# Patient Record
Sex: FEMALE | Race: WHITE | NOT HISPANIC OR LATINO | URBAN - NONMETROPOLITAN AREA
[De-identification: names, ages, dates, MRNs, and addresses within clinical notes are randomized per-mention and may not be internally consistent; named-entity substitution may affect disease eponyms.]

---

## 2024-03-06 ENCOUNTER — HOSPITAL ENCOUNTER (OUTPATIENT)
Dept: RADIOLOGY | Facility: HOSPITAL | Age: 74
Discharge: HOME | End: 2024-03-06
Payer: MEDICARE

## 2024-03-06 DIAGNOSIS — E78.5 HYPERLIPIDEMIA, UNSPECIFIED: ICD-10-CM

## 2024-03-06 PROCEDURE — 75571 CT HRT W/O DYE W/CA TEST: CPT

## 2024-11-27 ENCOUNTER — APPOINTMENT (OUTPATIENT)
Dept: RADIOLOGY | Facility: HOSPITAL | Age: 74
End: 2024-11-27
Payer: MEDICARE

## 2024-11-27 ENCOUNTER — HOSPITAL ENCOUNTER (EMERGENCY)
Facility: HOSPITAL | Age: 74
Discharge: HOME | End: 2024-11-27
Attending: EMERGENCY MEDICINE
Payer: MEDICARE

## 2024-11-27 VITALS
RESPIRATION RATE: 19 BRPM | WEIGHT: 253.53 LBS | HEART RATE: 85 BPM | BODY MASS INDEX: 37.55 KG/M2 | SYSTOLIC BLOOD PRESSURE: 130 MMHG | OXYGEN SATURATION: 99 % | DIASTOLIC BLOOD PRESSURE: 80 MMHG | TEMPERATURE: 97.2 F | HEIGHT: 69 IN

## 2024-11-27 DIAGNOSIS — S39.012A STRAIN OF LUMBAR REGION, INITIAL ENCOUNTER: ICD-10-CM

## 2024-11-27 DIAGNOSIS — T07.XXXA MULTIPLE CONTUSIONS: ICD-10-CM

## 2024-11-27 DIAGNOSIS — S09.90XA HEAD INJURY, INITIAL ENCOUNTER: ICD-10-CM

## 2024-11-27 DIAGNOSIS — S40.011A CONTUSION OF MULTIPLE SITES OF RIGHT SHOULDER, INITIAL ENCOUNTER: ICD-10-CM
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
## 2024-11-27 LAB
ANION GAP SERPL CALC-SCNC: 10 MMOL/L (ref 10–20)
APPEARANCE UR: ABNORMAL
BASOPHILS # BLD AUTO: 0.06 X10*3/UL (ref 0–0.1)
BASOPHILS NFR BLD AUTO: 0.6 %
BILIRUB UR STRIP.AUTO-MCNC: NEGATIVE MG/DL
BUN SERPL-MCNC: 19 MG/DL (ref 6–23)
CALCIUM SERPL-MCNC: 9.2 MG/DL (ref 8.6–10.3)
CHLORIDE SERPL-SCNC: 102 MMOL/L (ref 98–107)
CO2 SERPL-SCNC: 32 MMOL/L (ref 21–32)
COLOR UR: YELLOW
CREAT SERPL-MCNC: 0.64 MG/DL (ref 0.5–1.05)
EGFRCR SERPLBLD CKD-EPI 2021: >90 ML/MIN/1.73M*2
EOSINOPHIL # BLD AUTO: 0.09 X10*3/UL (ref 0–0.4)
EOSINOPHIL NFR BLD AUTO: 0.8 %
ERYTHROCYTE [DISTWIDTH] IN BLOOD BY AUTOMATED COUNT: 13.2 % (ref 11.5–14.5)
GLUCOSE SERPL-MCNC: 111 MG/DL (ref 74–99)
GLUCOSE UR STRIP.AUTO-MCNC: NEGATIVE MG/DL
HCT VFR BLD AUTO: 45.9 % (ref 36–46)
HGB BLD-MCNC: 15 G/DL (ref 12–16)
IMM GRANULOCYTES # BLD AUTO: 0.04 X10*3/UL (ref 0–0.5)
IMM GRANULOCYTES NFR BLD AUTO: 0.4 % (ref 0–0.9)
KETONES UR STRIP.AUTO-MCNC: ABNORMAL MG/DL
LEUKOCYTE ESTERASE UR QL STRIP.AUTO: NEGATIVE
LYMPHOCYTES # BLD AUTO: 1.48 X10*3/UL (ref 0.8–3)
LYMPHOCYTES NFR BLD AUTO: 13.8 %
MCH RBC QN AUTO: 31.3 PG (ref 26–34)
MCHC RBC AUTO-ENTMCNC: 32.7 G/DL (ref 32–36)
MCV RBC AUTO: 96 FL (ref 80–100)
MONOCYTES # BLD AUTO: 0.99 X10*3/UL (ref 0.05–0.8)
MONOCYTES NFR BLD AUTO: 9.2 %
MUCOUS THREADS #/AREA URNS AUTO: NORMAL /LPF
NEUTROPHILS # BLD AUTO: 8.08 X10*3/UL (ref 1.6–5.5)
NEUTROPHILS NFR BLD AUTO: 75.2 %
NITRITE UR QL STRIP.AUTO: NEGATIVE
NRBC BLD-RTO: 0 /100 WBCS (ref 0–0)
PH UR STRIP.AUTO: 6 [PH]
PLATELET # BLD AUTO: 238 X10*3/UL (ref 150–450)
POTASSIUM SERPL-SCNC: 3.6 MMOL/L (ref 3.5–5.3)
PROT UR STRIP.AUTO-MCNC: ABNORMAL MG/DL
RBC # BLD AUTO: 4.79 X10*6/UL (ref 4–5.2)
RBC # UR STRIP.AUTO: ABNORMAL /UL
RBC #/AREA URNS AUTO: NORMAL /HPF
SODIUM SERPL-SCNC: 140 MMOL/L (ref 136–145)
SP GR UR STRIP.AUTO: 1.02
SQUAMOUS #/AREA URNS AUTO: NORMAL /HPF
UROBILINOGEN UR STRIP.AUTO-MCNC: 2 MG/DL
WBC # BLD AUTO: 10.7 X10*3/UL (ref 4.4–11.3)
WBC #/AREA URNS AUTO: NORMAL /HPF

## 2024-11-27 PROCEDURE — 73000 X-RAY EXAM OF COLLAR BONE: CPT | Mod: RIGHT SIDE | Performed by: RADIOLOGY

## 2024-11-27 PROCEDURE — 70450 CT HEAD/BRAIN W/O DYE: CPT | Performed by: RADIOLOGY

## 2024-11-27 PROCEDURE — 72125 CT NECK SPINE W/O DYE: CPT | Performed by: RADIOLOGY

## 2024-11-27 PROCEDURE — 80048 BASIC METABOLIC PNL TOTAL CA: CPT | Performed by: EMERGENCY MEDICINE

## 2024-11-27 PROCEDURE — 72131 CT LUMBAR SPINE W/O DYE: CPT

## 2024-11-27 PROCEDURE — 81001 URINALYSIS AUTO W/SCOPE: CPT | Performed by: EMERGENCY MEDICINE

## 2024-11-27 PROCEDURE — 73030 X-RAY EXAM OF SHOULDER: CPT | Mod: RT

## 2024-11-27 PROCEDURE — 85025 COMPLETE CBC W/AUTO DIFF WBC: CPT | Performed by: EMERGENCY MEDICINE

## 2024-11-27 PROCEDURE — 36415 COLL VENOUS BLD VENIPUNCTURE: CPT | Performed by: EMERGENCY MEDICINE

## 2024-11-27 PROCEDURE — 70450 CT HEAD/BRAIN W/O DYE: CPT

## 2024-11-27 PROCEDURE — 99285 EMERGENCY DEPT VISIT HI MDM: CPT | Mod: 25

## 2024-11-27 PROCEDURE — 73000 X-RAY EXAM OF COLLAR BONE: CPT | Mod: RT

## 2024-11-27 PROCEDURE — 72125 CT NECK SPINE W/O DYE: CPT

## 2024-11-27 PROCEDURE — 72131 CT LUMBAR SPINE W/O DYE: CPT | Performed by: RADIOLOGY

## 2024-11-27 RX ORDER — FLUTICASONE FUROATE, UMECLIDINIUM BROMIDE AND VILANTEROL TRIFENATATE 200; 62.5; 25 UG/1; UG/1; UG/1
1 POWDER RESPIRATORY (INHALATION)
COMMUNITY
Start: 2024-01-04 | End: 2025-01-03

## 2024-11-27 RX ORDER — EVOLOCUMAB 420 MG/3.5
420 KIT SUBCUTANEOUS
COMMUNITY
Start: 2024-06-15

## 2024-11-27 RX ORDER — CLOPIDOGREL BISULFATE 75 MG/1
75 TABLET ORAL DAILY
COMMUNITY

## 2024-11-27 RX ORDER — ALBUTEROL SULFATE 90 UG/1
2 INHALANT RESPIRATORY (INHALATION) EVERY 4 HOURS PRN
COMMUNITY

## 2024-11-27 RX ORDER — MONTELUKAST SODIUM 10 MG/1
1 TABLET ORAL NIGHTLY
COMMUNITY
Start: 2024-01-04

## 2024-11-27 RX ORDER — POTASSIUM CHLORIDE 750 MG/1
10 TABLET, EXTENDED RELEASE ORAL DAILY
COMMUNITY

## 2024-11-27 RX ORDER — PRAVASTATIN SODIUM 40 MG/1
40 TABLET ORAL DAILY
COMMUNITY

## 2024-11-27 RX ORDER — FUROSEMIDE 40 MG/1
80 TABLET ORAL
COMMUNITY
Start: 2024-01-04

## 2024-11-27 RX ORDER — LEVOTHYROXINE SODIUM 150 UG/1
150 TABLET ORAL DAILY
COMMUNITY

## 2024-11-27 ASSESSMENT — PAIN - FUNCTIONAL ASSESSMENT: PAIN_FUNCTIONAL_ASSESSMENT: 0-10

## 2024-11-27 ASSESSMENT — PAIN SCALES - GENERAL: PAINLEVEL_OUTOF10: 10 - WORST POSSIBLE PAIN

## 2024-11-27 NOTE — ED PROVIDER NOTES
[facility]   ED  Provider Note  11/27/2024 11:46 AM  AC08/AC08      Chief Complaint   Patient presents with    Fall        History of Present Illness:   Padmini Lang is a 74 y.o. female presenting to the ED for fall, beginning just prior to arrival.  The complaint has been persistent, moderate in severity, and worsened by movement of the right shoulder.  Patient is stroke 3 years ago and has problems with her balance and ambulation.  She is walking out the car on uneven ground when she lost her balance and fell.  She had no prodrome such as palpitations dizziness or blurred vision.  She did not trip.  She denies chest pain.  She denies change in speech or vision.  She complains of pain to the back of her head and neck.  Right shoulder and distal clavicle and lumbar spine.  She denies injury to her  lower extremities.  There is no loss of consciousness.      Review of Systems:   Pertinent positives and review of systems as noted above.  Remaining 10 review of systems is negative or noncontributory to today's episode of care.  Review of Systems       --------------------------------------------- PAST HISTORY ---------------------------------------------  Past Medical History:   Past Medical History:   Diagnosis Date    Adhesive capsulitis of right shoulder 06/22/2017    Adhesive capsulitis of both shoulders    Mast cell disease     Personal history of diseases of the blood and blood-forming organs and certain disorders involving the immune mechanism     History of anemia    Personal history of other diseases of the musculoskeletal system and connective tissue 11/05/2014    Personal history of arthritis    Personal history of other diseases of the respiratory system 11/05/2014    Personal history of asthma    Personal history of other endocrine, nutritional and metabolic disease 11/05/2014    History of thyroid disease    Personal history of other specified conditions     History of headache    Stroke (Multi)          Past Surgical History:   Past Surgical History:   Procedure Laterality Date    BACK SURGERY  09/23/2015    Back Surgery    HYSTERECTOMY  05/11/2017    Hysterectomy    OTHER SURGICAL HISTORY  01/31/2019    Cataract surgery    TOTAL HIP ARTHROPLASTY  05/11/2017    Hip Replacement    TOTAL KNEE ARTHROPLASTY  09/23/2015    Total Knee Arthroplasty    TUBAL LIGATION  05/11/2017    Tubal Ligation        Social History:   Social History     Social History Narrative    Not on file        Family History: family history is not on file. Unless otherwise noted, family history is non contributory    Patient's Medications   New Prescriptions    No medications on file   Previous Medications    ALBUTEROL 90 MCG/ACTUATION INHALER    Inhale 2 puffs every 4 hours if needed for wheezing or shortness of breath.    CLOPIDOGREL (PLAVIX) 75 MG TABLET    Take 1 tablet (75 mg) by mouth once daily.    FUROSEMIDE (LASIX) 40 MG TABLET    Take 2 tablets (80 mg) by mouth once daily.    MONTELUKAST (SINGULAIR) 10 MG TABLET    Take 1 tablet (10 mg) by mouth once daily at bedtime.    POTASSIUM CHLORIDE CR 10 MEQ ER TABLET    Take 1 tablet (10 mEq) by mouth once daily.    PRAVASTATIN (PRAVACHOL) 40 MG TABLET    Take 1 tablet (40 mg) by mouth once daily.    REPATHA PUSHTRONEX 420 MG/3.5 ML INJECTION    Inject 3.5 mL (420 mg) under the skin every 28 (twenty-eight) days.    RIMEGEPANT (NURTEC ODT) 75 MG TABLET,DISINTEGRATING    Dissolve 1 tablet (75 mg) in the mouth once daily as needed.    SYNTHROID 150 MCG TABLET    Take 1 tablet (150 mcg) by mouth once daily.    TRELEGY ELLIPTA 200-62.5-25 MCG BLISTER WITH DEVICE    Inhale 1 puff once daily.    VITAMIN B COMPLEX ORAL    Take 0.4 mg by mouth once daily.   Modified Medications    No medications on file   Discontinued Medications    No medications on file      The patient’s home medications have been reviewed.    Allergies: Aspirin, Azithromycin, Doxycycline, Fish containing products, Naproxen,  Nsaids (non-steroidal anti-inflammatory drug), Prochlorperazine, Shellfish containing products, Sulfamethoxazole-trimethoprim, Tree nuts, Acetaminophen, Cephalexin, Clindamycin, Nicorette ds, Iodinated contrast media, Adhesive tape-silicones, Carbamazepine, Carbinoxamine, Ciprofloxacin, Fentanyl, Gabapentin, Lidocaine, Metronidazole, Oxycodone, and Vancomycin    -------------------------------------------------- RESULTS -------------------------------------------------  All laboratory and radiology results have been personally reviewed by myself   LABS:  Labs Reviewed   CBC WITH AUTO DIFFERENTIAL - Abnormal       Result Value    WBC 10.7      nRBC 0.0      RBC 4.79      Hemoglobin 15.0      Hematocrit 45.9      MCV 96      MCH 31.3      MCHC 32.7      RDW 13.2      Platelets 238      Neutrophils % 75.2      Immature Granulocytes %, Automated 0.4      Lymphocytes % 13.8      Monocytes % 9.2      Eosinophils % 0.8      Basophils % 0.6      Neutrophils Absolute 8.08 (*)     Immature Granulocytes Absolute, Automated 0.04      Lymphocytes Absolute 1.48      Monocytes Absolute 0.99 (*)     Eosinophils Absolute 0.09      Basophils Absolute 0.06     BASIC METABOLIC PANEL - Abnormal    Glucose 111 (*)     Sodium 140      Potassium 3.6      Chloride 102      Bicarbonate 32      Anion Gap 10      Urea Nitrogen 19      Creatinine 0.64      eGFR >90      Calcium 9.2     URINALYSIS WITH REFLEX CULTURE AND MICROSCOPIC    Narrative:     The following orders were created for panel order Urinalysis with Reflex Culture and Microscopic.  Procedure                               Abnormality         Status                     ---------                               -----------         ------                     Urinalysis with Reflex C...[[id number]]                                                 Extra Urine Gray Tube[[id number]]                                                         Please view results for these tests on the individual  "orders.   URINALYSIS WITH REFLEX CULTURE AND MICROSCOPIC   EXTRA URINE GRAY TUBE         RADIOLOGY:  Interpreted by Radiologist.  XR shoulder right 2+ views   Final Result   No acute finding.   Signed by Surendra Vo MD      XR clavicle right   Final Result   No acute finding.   Signed by Surendra Vo MD      CT head wo IV contrast   Final Result   No acute intracranial abnormality. Consider follow-up with MRI as   warranted.             Signed by: Angel Russo 11/27/2024 12:58 PM   Dictation workstation:   [id number]      CT lumbar spine wo IV contrast   Final Result   No acute osseous abnormality in the lumbar spine.        Right lower lobe pulmonary nodule, for which follow-up with   nonemergent CT chest is recommended.        MACRO:   None        Signed by: Angel Russo 11/27/2024 1:08 PM   Dictation workstation:   [id number]      CT cervical spine wo IV contrast   Final Result   No evidence for an acute fracture or subluxation of the cervical   spine.        Signed by: Angel Russo 11/27/2024 1:06 PM   Dictation workstation:   [id number]          No results found for this or any previous visit (from the past 4464 hours).  ------------------------- NURSING NOTES AND VITALS REVIEWED ---------------------------   The nursing notes within the ED encounter and vital signs as below have been reviewed.   /64   Pulse 92   Temp 36.2 °C (97.2 °F)   Resp 17   Ht 1.753 m (5' 9\")   Wt 115 kg (253 lb 8.5 oz)   SpO2 94%   BMI 37.44 kg/m²   Oxygen Saturation Interpretation: Normal      ---------------------------------------------------PHYSICAL EXAM--------------------------------------  Physical Exam   Constitutional/General: Alert,  well appearing, non toxic in NAD  Head: Normocephalic and occipital tenderness is present.  Eyes: PERRL, EOMI, conjunctiva normal, sclera non icteric  Mouth: Oropharynx clear, handling secretions, no trismus, no asymmetry of the posterior oropharynx or uvular " edema  Neck: Supple, full ROM, non tender to palpation in the midline, no stridor, no crepitus, no meningeal signs  Respiratory: Lungs clear to auscultation bilaterally, no wheezes, rales, or rhonchi. Not in respiratory distress  Cardiovascular:  Regular rate. Regular rhythm. No murmurs, gallops, or rubs. 2+ distal pulses  Chest: No chest wall tenderness  GI:  Abdomen Soft, Non tender, Non distended.  +BS. No organomegaly, no palpable masses,  No rebound, guarding, or rigidity.   Musculoskeletal: Moves all extremities x 4. Warm and well perfused, no clubbing, cyanosis, or edema. Capillary refill <3 seconds tenderness over the right distal clavicle lungs shoulder.,  Lower to mid lumbar tenderness to palpation.  Integument: skin warm and dry. No rashes.   Lymphatic: no lymphadenopathy noted  Neurologic: No focal deficits, symmetric strength 5/5 in the upper and lower extremities bilaterally  Psychiatric: Normal Affect    Procedures    ------------------------------ ED COURSE/MEDICAL DECISION MAKING----------------------  Diagnoses as of 11/27/24 1322   Fall, initial encounter   Multiple contusions   Head injury, initial encounter   Contusion of multiple sites of right shoulder, initial encounter   Strain of lumbar region, initial encounter      Patient has multiple drug allergies.  She is taking Plavix.  She states she cannot take any pain medications.  Patient CT scan of the head and neck and lumbar spine show no acute fractures.  X-rays of the right clavicle and right shoulder are also negative for acute fracture.  Unfortunately patient truly has multiple drug allergies and everything she tries to take for pain causes more problems than it solves.  After her last shoulder surgery they gave her pain medicines for that she was discharged and she was blistered and itching by time she got home and had remitted here to the hospital for treatment.  I have asked use ice packs and a sling for comfort.  She is limited her  walking until her shoulder feels worn out that she can use her walker effectively.    Medical Decision Making:   Patient is stable for outpatient management  Diagnoses as of 11/27/24 1322   Fall, initial encounter   Multiple contusions   Head injury, initial encounter   Contusion of multiple sites of right shoulder, initial encounter   Strain of lumbar region, initial encounter      Counseling:   The emergency provider has spoken with the patient and spouse/SO and discussed today’s results, in addition to providing specific details for the plan of care and counseling regarding the diagnosis and prognosis.  Questions are answered at this time and they are agreeable with the plan.      --------------------------------- IMPRESSION AND DISPOSITION ---------------------------------        IMPRESSION  1. Fall, initial encounter    2. Multiple contusions    3. Head injury, initial encounter    4. Contusion of multiple sites of right shoulder, initial encounter    5. Strain of lumbar region, initial encounter        DISPOSITION  Disposition: Discharge to home  Patient condition is fair      Billing Provider Critical Care Time: 0 minutes     oJse Suh MD  11/27/24 1322

## 2024-11-27 NOTE — DISCHARGE INSTRUCTIONS
Ice packs for pain.    Use your sling for comfort.    Make sure to use your walker for safety.    Return for worsening symptoms or concerns.    See your doctor in 1 week for recheck.

## 2024-11-28 LAB — HOLD SPECIMEN: NORMAL

## 2025-02-03 ENCOUNTER — APPOINTMENT (OUTPATIENT)
Dept: CARDIOLOGY | Facility: HOSPITAL | Age: 75
End: 2025-02-03
Payer: MEDICARE

## 2025-02-03 ENCOUNTER — APPOINTMENT (OUTPATIENT)
Dept: RADIOLOGY | Facility: HOSPITAL | Age: 75
End: 2025-02-03
Payer: MEDICARE

## 2025-02-03 ENCOUNTER — HOSPITAL ENCOUNTER (INPATIENT)
Facility: HOSPITAL | Age: 75
LOS: 4 days | Discharge: HOME | End: 2025-02-08
Attending: EMERGENCY MEDICINE | Admitting: INTERNAL MEDICINE
Payer: MEDICARE

## 2025-02-03 DIAGNOSIS — I50.32 CHRONIC DIASTOLIC HEART FAILURE: ICD-10-CM

## 2025-02-03 DIAGNOSIS — J15.9 COMMUNITY ACQUIRED BACTERIAL PNEUMONIA: ICD-10-CM

## 2025-02-03 DIAGNOSIS — J18.9 MULTIFOCAL PNEUMONIA: ICD-10-CM

## 2025-02-03 DIAGNOSIS — A41.9 SEPSIS, DUE TO UNSPECIFIED ORGANISM, UNSPECIFIED WHETHER ACUTE ORGAN DYSFUNCTION PRESENT (MULTI): ICD-10-CM

## 2025-02-03 DIAGNOSIS — J96.01 ACUTE RESPIRATORY FAILURE WITH HYPOXIA (MULTI): ICD-10-CM

## 2025-02-03 DIAGNOSIS — J44.1 COPD EXACERBATION (MULTI): ICD-10-CM

## 2025-02-03 DIAGNOSIS — R09.02 HYPOXIA: ICD-10-CM

## 2025-02-03 DIAGNOSIS — R91.8 MULTILOBAR LUNG INFILTRATE: Primary | ICD-10-CM

## 2025-02-03 LAB
ALBUMIN SERPL BCP-MCNC: 3.5 G/DL (ref 3.4–5)
ALP SERPL-CCNC: 57 U/L (ref 33–136)
ALT SERPL W P-5'-P-CCNC: 22 U/L (ref 7–45)
ANION GAP SERPL CALC-SCNC: 9 MMOL/L (ref 10–20)
AST SERPL W P-5'-P-CCNC: 14 U/L (ref 9–39)
BASOPHILS # BLD AUTO: 0.05 X10*3/UL (ref 0–0.1)
BASOPHILS NFR BLD AUTO: 0.4 %
BILIRUB SERPL-MCNC: 0.4 MG/DL (ref 0–1.2)
BNP SERPL-MCNC: 109 PG/ML (ref 0–99)
BUN SERPL-MCNC: 6 MG/DL (ref 6–23)
CALCIUM SERPL-MCNC: 8.6 MG/DL (ref 8.6–10.3)
CARDIAC TROPONIN I PNL SERPL HS: 21 NG/L (ref 0–13)
CARDIAC TROPONIN I PNL SERPL HS: 21 NG/L (ref 0–13)
CHLORIDE SERPL-SCNC: 97 MMOL/L (ref 98–107)
CO2 SERPL-SCNC: 37 MMOL/L (ref 21–32)
CREAT SERPL-MCNC: 0.45 MG/DL (ref 0.5–1.05)
EGFRCR SERPLBLD CKD-EPI 2021: >90 ML/MIN/1.73M*2
EOSINOPHIL # BLD AUTO: 0.26 X10*3/UL (ref 0–0.4)
EOSINOPHIL NFR BLD AUTO: 2.2 %
ERYTHROCYTE [DISTWIDTH] IN BLOOD BY AUTOMATED COUNT: 12.7 % (ref 11.5–14.5)
FLUAV RNA RESP QL NAA+PROBE: NOT DETECTED
FLUBV RNA RESP QL NAA+PROBE: NOT DETECTED
GLUCOSE SERPL-MCNC: 109 MG/DL (ref 74–99)
HCT VFR BLD AUTO: 42.2 % (ref 36–46)
HGB BLD-MCNC: 13.3 G/DL (ref 12–16)
HOLD SPECIMEN: NORMAL
IMM GRANULOCYTES # BLD AUTO: 0.06 X10*3/UL (ref 0–0.5)
IMM GRANULOCYTES NFR BLD AUTO: 0.5 % (ref 0–0.9)
LACTATE SERPL-SCNC: 0.8 MMOL/L (ref 0.4–2)
LYMPHOCYTES # BLD AUTO: 1.9 X10*3/UL (ref 0.8–3)
LYMPHOCYTES NFR BLD AUTO: 16.3 %
MCH RBC QN AUTO: 31.1 PG (ref 26–34)
MCHC RBC AUTO-ENTMCNC: 31.5 G/DL (ref 32–36)
MCV RBC AUTO: 99 FL (ref 80–100)
MONOCYTES # BLD AUTO: 1.29 X10*3/UL (ref 0.05–0.8)
MONOCYTES NFR BLD AUTO: 11 %
NEUTROPHILS # BLD AUTO: 8.12 X10*3/UL (ref 1.6–5.5)
NEUTROPHILS NFR BLD AUTO: 69.6 %
NRBC BLD-RTO: 0 /100 WBCS (ref 0–0)
PLATELET # BLD AUTO: 312 X10*3/UL (ref 150–450)
POTASSIUM SERPL-SCNC: 3.6 MMOL/L (ref 3.5–5.3)
PROT SERPL-MCNC: 6.4 G/DL (ref 6.4–8.2)
RBC # BLD AUTO: 4.28 X10*6/UL (ref 4–5.2)
RSV RNA RESP QL NAA+PROBE: NOT DETECTED
SARS-COV-2 RNA RESP QL NAA+PROBE: NOT DETECTED
SODIUM SERPL-SCNC: 139 MMOL/L (ref 136–145)
WBC # BLD AUTO: 11.7 X10*3/UL (ref 4.4–11.3)

## 2025-02-03 PROCEDURE — 83880 ASSAY OF NATRIURETIC PEPTIDE: CPT | Performed by: EMERGENCY MEDICINE

## 2025-02-03 PROCEDURE — 96367 TX/PROPH/DG ADDL SEQ IV INF: CPT

## 2025-02-03 PROCEDURE — G0378 HOSPITAL OBSERVATION PER HR: HCPCS

## 2025-02-03 PROCEDURE — 71045 X-RAY EXAM CHEST 1 VIEW: CPT

## 2025-02-03 PROCEDURE — 93005 ELECTROCARDIOGRAM TRACING: CPT

## 2025-02-03 PROCEDURE — 36415 COLL VENOUS BLD VENIPUNCTURE: CPT | Performed by: EMERGENCY MEDICINE

## 2025-02-03 PROCEDURE — 94760 N-INVAS EAR/PLS OXIMETRY 1: CPT

## 2025-02-03 PROCEDURE — 99292 CRITICAL CARE ADDL 30 MIN: CPT | Performed by: EMERGENCY MEDICINE

## 2025-02-03 PROCEDURE — 96365 THER/PROPH/DIAG IV INF INIT: CPT

## 2025-02-03 PROCEDURE — 83605 ASSAY OF LACTIC ACID: CPT | Performed by: EMERGENCY MEDICINE

## 2025-02-03 PROCEDURE — 87634 RSV DNA/RNA AMP PROBE: CPT | Performed by: EMERGENCY MEDICINE

## 2025-02-03 PROCEDURE — 80053 COMPREHEN METABOLIC PANEL: CPT | Performed by: EMERGENCY MEDICINE

## 2025-02-03 PROCEDURE — 87040 BLOOD CULTURE FOR BACTERIA: CPT | Mod: CONLAB | Performed by: EMERGENCY MEDICINE

## 2025-02-03 PROCEDURE — 99291 CRITICAL CARE FIRST HOUR: CPT | Mod: 25 | Performed by: EMERGENCY MEDICINE

## 2025-02-03 PROCEDURE — 96361 HYDRATE IV INFUSION ADD-ON: CPT

## 2025-02-03 PROCEDURE — 84484 ASSAY OF TROPONIN QUANT: CPT | Performed by: EMERGENCY MEDICINE

## 2025-02-03 PROCEDURE — 2500000004 HC RX 250 GENERAL PHARMACY W/ HCPCS (ALT 636 FOR OP/ED): Performed by: EMERGENCY MEDICINE

## 2025-02-03 PROCEDURE — 94640 AIRWAY INHALATION TREATMENT: CPT

## 2025-02-03 PROCEDURE — 87637 SARSCOV2&INF A&B&RSV AMP PRB: CPT | Performed by: EMERGENCY MEDICINE

## 2025-02-03 PROCEDURE — 2500000005 HC RX 250 GENERAL PHARMACY W/O HCPCS: Performed by: EMERGENCY MEDICINE

## 2025-02-03 PROCEDURE — 9420000001 HC RT PATIENT EDUCATION 5 MIN

## 2025-02-03 PROCEDURE — 96375 TX/PRO/DX INJ NEW DRUG ADDON: CPT

## 2025-02-03 PROCEDURE — 2500000002 HC RX 250 W HCPCS SELF ADMINISTERED DRUGS (ALT 637 FOR MEDICARE OP, ALT 636 FOR OP/ED): Performed by: EMERGENCY MEDICINE

## 2025-02-03 PROCEDURE — 2500000005 HC RX 250 GENERAL PHARMACY W/O HCPCS: Performed by: NURSE PRACTITIONER

## 2025-02-03 PROCEDURE — 2500000001 HC RX 250 WO HCPCS SELF ADMINISTERED DRUGS (ALT 637 FOR MEDICARE OP): Performed by: NURSE PRACTITIONER

## 2025-02-03 PROCEDURE — 85025 COMPLETE CBC W/AUTO DIFF WBC: CPT | Performed by: EMERGENCY MEDICINE

## 2025-02-03 PROCEDURE — 71045 X-RAY EXAM CHEST 1 VIEW: CPT | Performed by: RADIOLOGY

## 2025-02-03 PROCEDURE — 94664 DEMO&/EVAL PT USE INHALER: CPT

## 2025-02-03 RX ORDER — POLYETHYLENE GLYCOL 3350 17 G/17G
17 POWDER, FOR SOLUTION ORAL DAILY
Status: DISCONTINUED | OUTPATIENT
Start: 2025-02-03 | End: 2025-02-05

## 2025-02-03 RX ORDER — CEFTRIAXONE 2 G/50ML
2 INJECTION, SOLUTION INTRAVENOUS ONCE
Status: COMPLETED | OUTPATIENT
Start: 2025-02-03 | End: 2025-02-03

## 2025-02-03 RX ORDER — CLOPIDOGREL BISULFATE 75 MG/1
75 TABLET ORAL DAILY
Status: DISCONTINUED | OUTPATIENT
Start: 2025-02-03 | End: 2025-02-08 | Stop reason: HOSPADM

## 2025-02-03 RX ORDER — IPRATROPIUM BROMIDE AND ALBUTEROL SULFATE 2.5; .5 MG/3ML; MG/3ML
3 SOLUTION RESPIRATORY (INHALATION) ONCE
Status: COMPLETED | OUTPATIENT
Start: 2025-02-03 | End: 2025-02-03

## 2025-02-03 RX ORDER — LEVOTHYROXINE SODIUM 75 UG/1
150 TABLET ORAL DAILY
Status: DISCONTINUED | OUTPATIENT
Start: 2025-02-04 | End: 2025-02-08 | Stop reason: HOSPADM

## 2025-02-03 RX ORDER — MONTELUKAST SODIUM 10 MG/1
10 TABLET ORAL NIGHTLY
Status: DISCONTINUED | OUTPATIENT
Start: 2025-02-03 | End: 2025-02-08 | Stop reason: HOSPADM

## 2025-02-03 RX ORDER — DIPHENHYDRAMINE HCL 25 MG
25 CAPSULE ORAL EVERY 6 HOURS PRN
Status: DISCONTINUED | OUTPATIENT
Start: 2025-02-03 | End: 2025-02-08 | Stop reason: HOSPADM

## 2025-02-03 RX ORDER — FUROSEMIDE 40 MG/1
80 TABLET ORAL
Status: DISCONTINUED | OUTPATIENT
Start: 2025-02-04 | End: 2025-02-05

## 2025-02-03 RX ORDER — EVOLOCUMAB 140 MG/ML
140 INJECTION, SOLUTION SUBCUTANEOUS
COMMUNITY
Start: 2024-11-20

## 2025-02-03 RX ORDER — AZITHROMYCIN MONOHYDRATE 500 MG/5ML
INJECTION, POWDER, LYOPHILIZED, FOR SOLUTION INTRAVENOUS
Status: DISPENSED
Start: 2025-02-03 | End: 2025-02-04

## 2025-02-03 RX ORDER — FUROSEMIDE 40 MG/1
40 TABLET ORAL
COMMUNITY
End: 2025-02-08 | Stop reason: HOSPADM

## 2025-02-03 RX ORDER — EPINEPHRINE 0.3 MG/.3ML
1 INJECTION SUBCUTANEOUS ONCE AS NEEDED
COMMUNITY
Start: 2024-01-04

## 2025-02-03 RX ORDER — ONDANSETRON 4 MG/1
4 TABLET, ORALLY DISINTEGRATING ORAL EVERY 8 HOURS PRN
Status: DISCONTINUED | OUTPATIENT
Start: 2025-02-03 | End: 2025-02-06

## 2025-02-03 RX ORDER — FLUTICASONE FUROATE AND VILANTEROL 200; 25 UG/1; UG/1
1 POWDER RESPIRATORY (INHALATION)
Status: DISCONTINUED | OUTPATIENT
Start: 2025-02-04 | End: 2025-02-04

## 2025-02-03 RX ORDER — PRAVASTATIN SODIUM 20 MG/1
40 TABLET ORAL DAILY
Status: DISCONTINUED | OUTPATIENT
Start: 2025-02-03 | End: 2025-02-03

## 2025-02-03 RX ORDER — ENOXAPARIN SODIUM 100 MG/ML
40 INJECTION SUBCUTANEOUS EVERY 24 HOURS
Status: DISCONTINUED | OUTPATIENT
Start: 2025-02-03 | End: 2025-02-08 | Stop reason: HOSPADM

## 2025-02-03 RX ORDER — PANTOPRAZOLE SODIUM 40 MG/1
40 TABLET, DELAYED RELEASE ORAL
Status: DISCONTINUED | OUTPATIENT
Start: 2025-02-04 | End: 2025-02-08 | Stop reason: HOSPADM

## 2025-02-03 RX ORDER — ONDANSETRON HYDROCHLORIDE 2 MG/ML
4 INJECTION, SOLUTION INTRAVENOUS EVERY 8 HOURS PRN
Status: DISCONTINUED | OUTPATIENT
Start: 2025-02-03 | End: 2025-02-08 | Stop reason: HOSPADM

## 2025-02-03 RX ORDER — POTASSIUM CHLORIDE 750 MG/1
10 TABLET, FILM COATED, EXTENDED RELEASE ORAL DAILY
Status: DISCONTINUED | OUTPATIENT
Start: 2025-02-03 | End: 2025-02-08 | Stop reason: HOSPADM

## 2025-02-03 RX ORDER — DIPHENHYDRAMINE HYDROCHLORIDE 50 MG/ML
25 INJECTION INTRAMUSCULAR; INTRAVENOUS ONCE
Status: COMPLETED | OUTPATIENT
Start: 2025-02-03 | End: 2025-02-03

## 2025-02-03 RX ORDER — PANTOPRAZOLE SODIUM 40 MG/10ML
40 INJECTION, POWDER, LYOPHILIZED, FOR SOLUTION INTRAVENOUS
Status: DISCONTINUED | OUTPATIENT
Start: 2025-02-04 | End: 2025-02-06

## 2025-02-03 RX ADMIN — CEFTRIAXONE 2 G: 2 INJECTION, SOLUTION INTRAVENOUS at 17:06

## 2025-02-03 RX ADMIN — Medication 2 L/MIN: at 13:15

## 2025-02-03 RX ADMIN — METHYLPREDNISOLONE SODIUM SUCCINATE 125 MG: 125 INJECTION, POWDER, FOR SOLUTION INTRAMUSCULAR; INTRAVENOUS at 14:59

## 2025-02-03 RX ADMIN — DIPHENHYDRAMINE HYDROCHLORIDE 25 MG: 50 INJECTION INTRAMUSCULAR; INTRAVENOUS at 14:59

## 2025-02-03 RX ADMIN — MONTELUKAST 10 MG: 10 TABLET, FILM COATED ORAL at 21:59

## 2025-02-03 RX ADMIN — IPRATROPIUM BROMIDE AND ALBUTEROL SULFATE 3 ML: .5; 3 SOLUTION RESPIRATORY (INHALATION) at 14:55

## 2025-02-03 RX ADMIN — AZITHROMYCIN MONOHYDRATE 500 MG: 500 INJECTION, POWDER, LYOPHILIZED, FOR SOLUTION INTRAVENOUS at 18:09

## 2025-02-03 RX ADMIN — Medication 2 L/MIN: at 20:29

## 2025-02-03 RX ADMIN — SODIUM CHLORIDE 1000 ML: 9 INJECTION, SOLUTION INTRAVENOUS at 15:00

## 2025-02-03 SDOH — SOCIAL STABILITY: SOCIAL INSECURITY
WITHIN THE LAST YEAR, HAVE YOU BEEN HUMILIATED OR EMOTIONALLY ABUSED IN OTHER WAYS BY YOUR PARTNER OR EX-PARTNER?: PATIENT UNABLE TO ANSWER

## 2025-02-03 SDOH — SOCIAL STABILITY: SOCIAL INSECURITY: DO YOU FEEL ANYONE HAS EXPLOITED OR TAKEN ADVANTAGE OF YOU FINANCIALLY OR OF YOUR PERSONAL PROPERTY?: NO

## 2025-02-03 SDOH — SOCIAL STABILITY: SOCIAL INSECURITY: WITHIN THE LAST YEAR, HAVE YOU BEEN AFRAID OF YOUR PARTNER OR EX-PARTNER?: PATIENT UNABLE TO ANSWER

## 2025-02-03 SDOH — SOCIAL STABILITY: SOCIAL INSECURITY: HAVE YOU HAD THOUGHTS OF HARMING ANYONE ELSE?: NO

## 2025-02-03 SDOH — SOCIAL STABILITY: SOCIAL INSECURITY: DOES ANYONE TRY TO KEEP YOU FROM HAVING/CONTACTING OTHER FRIENDS OR DOING THINGS OUTSIDE YOUR HOME?: NO

## 2025-02-03 SDOH — SOCIAL STABILITY: SOCIAL INSECURITY: HAVE YOU HAD ANY THOUGHTS OF HARMING ANYONE ELSE?: NO

## 2025-02-03 SDOH — SOCIAL STABILITY: SOCIAL INSECURITY: ABUSE: ADULT

## 2025-02-03 SDOH — SOCIAL STABILITY: SOCIAL INSECURITY: ARE YOU OR HAVE YOU BEEN THREATENED OR ABUSED PHYSICALLY, EMOTIONALLY, OR SEXUALLY BY ANYONE?: NO

## 2025-02-03 SDOH — ECONOMIC STABILITY: FOOD INSECURITY
WITHIN THE PAST 12 MONTHS, YOU WORRIED THAT YOUR FOOD WOULD RUN OUT BEFORE YOU GOT THE MONEY TO BUY MORE.: PATIENT DECLINED

## 2025-02-03 SDOH — SOCIAL STABILITY: SOCIAL INSECURITY
WITHIN THE LAST YEAR, HAVE YOU BEEN KICKED, HIT, SLAPPED, OR OTHERWISE PHYSICALLY HURT BY YOUR PARTNER OR EX-PARTNER?: PATIENT UNABLE TO ANSWER

## 2025-02-03 SDOH — SOCIAL STABILITY: SOCIAL INSECURITY: HAS ANYONE EVER THREATENED TO HURT YOUR FAMILY OR YOUR PETS?: NO

## 2025-02-03 SDOH — SOCIAL STABILITY: SOCIAL INSECURITY: ARE THERE ANY APPARENT SIGNS OF INJURIES/BEHAVIORS THAT COULD BE RELATED TO ABUSE/NEGLECT?: NO

## 2025-02-03 SDOH — ECONOMIC STABILITY: FOOD INSECURITY: WITHIN THE PAST 12 MONTHS, THE FOOD YOU BOUGHT JUST DIDN'T LAST AND YOU DIDN'T HAVE MONEY TO GET MORE.: PATIENT DECLINED

## 2025-02-03 SDOH — SOCIAL STABILITY: SOCIAL INSECURITY: DO YOU FEEL UNSAFE GOING BACK TO THE PLACE WHERE YOU ARE LIVING?: NO

## 2025-02-03 SDOH — SOCIAL STABILITY: SOCIAL INSECURITY: WERE YOU ABLE TO COMPLETE ALL THE BEHAVIORAL HEALTH SCREENINGS?: YES

## 2025-02-03 SDOH — SOCIAL STABILITY: SOCIAL INSECURITY
WITHIN THE LAST YEAR, HAVE YOU BEEN RAPED OR FORCED TO HAVE ANY KIND OF SEXUAL ACTIVITY BY YOUR PARTNER OR EX-PARTNER?: PATIENT UNABLE TO ANSWER

## 2025-02-03 SDOH — ECONOMIC STABILITY: INCOME INSECURITY
IN THE PAST 12 MONTHS HAS THE ELECTRIC, GAS, OIL, OR WATER COMPANY THREATENED TO SHUT OFF SERVICES IN YOUR HOME?: PATIENT UNABLE TO ANSWER

## 2025-02-03 ASSESSMENT — COGNITIVE AND FUNCTIONAL STATUS - GENERAL
PATIENT BASELINE BEDBOUND: NO
MOBILITY SCORE: 22
PERSONAL GROOMING: A LITTLE
TOILETING: A LITTLE
DRESSING REGULAR LOWER BODY CLOTHING: A LITTLE
CLIMB 3 TO 5 STEPS WITH RAILING: A LITTLE
DAILY ACTIVITIY SCORE: 19
HELP NEEDED FOR BATHING: A LITTLE
DRESSING REGULAR UPPER BODY CLOTHING: A LITTLE
WALKING IN HOSPITAL ROOM: A LITTLE

## 2025-02-03 ASSESSMENT — ACTIVITIES OF DAILY LIVING (ADL)
GROOMING: NEEDS ASSISTANCE
TOILETING: NEEDS ASSISTANCE
ADEQUATE_TO_COMPLETE_ADL: YES
LACK_OF_TRANSPORTATION: PATIENT UNABLE TO ANSWER
FEEDING YOURSELF: NEEDS ASSISTANCE
HEARING - RIGHT EAR: FUNCTIONAL
HEARING - LEFT EAR: FUNCTIONAL
JUDGMENT_ADEQUATE_SAFELY_COMPLETE_DAILY_ACTIVITIES: YES
DRESSING YOURSELF: NEEDS ASSISTANCE
BATHING: NEEDS ASSISTANCE
ASSISTIVE_DEVICE: EYEGLASSES
WALKS IN HOME: NEEDS ASSISTANCE
PATIENT'S MEMORY ADEQUATE TO SAFELY COMPLETE DAILY ACTIVITIES?: YES

## 2025-02-03 ASSESSMENT — LIFESTYLE VARIABLES
AUDIT-C TOTAL SCORE: 0
SKIP TO QUESTIONS 9-10: 1
AUDIT-C TOTAL SCORE: 0
HOW MANY STANDARD DRINKS CONTAINING ALCOHOL DO YOU HAVE ON A TYPICAL DAY: PATIENT DOES NOT DRINK
HOW OFTEN DO YOU HAVE A DRINK CONTAINING ALCOHOL: NEVER
HOW OFTEN DO YOU HAVE 6 OR MORE DRINKS ON ONE OCCASION: NEVER

## 2025-02-03 ASSESSMENT — PAIN - FUNCTIONAL ASSESSMENT
PAIN_FUNCTIONAL_ASSESSMENT: 0-10
PAIN_FUNCTIONAL_ASSESSMENT: 0-10

## 2025-02-03 ASSESSMENT — PAIN SCALES - GENERAL
PAINLEVEL_OUTOF10: 0 - NO PAIN
PAINLEVEL_OUTOF10: 0 - NO PAIN
PAINLEVEL_OUTOF10: 5 - MODERATE PAIN

## 2025-02-03 ASSESSMENT — COLUMBIA-SUICIDE SEVERITY RATING SCALE - C-SSRS
1. IN THE PAST MONTH, HAVE YOU WISHED YOU WERE DEAD OR WISHED YOU COULD GO TO SLEEP AND NOT WAKE UP?: NO
2. HAVE YOU ACTUALLY HAD ANY THOUGHTS OF KILLING YOURSELF?: NO
6. HAVE YOU EVER DONE ANYTHING, STARTED TO DO ANYTHING, OR PREPARED TO DO ANYTHING TO END YOUR LIFE?: NO

## 2025-02-03 ASSESSMENT — PATIENT HEALTH QUESTIONNAIRE - PHQ9
1. LITTLE INTEREST OR PLEASURE IN DOING THINGS: NOT AT ALL
SUM OF ALL RESPONSES TO PHQ9 QUESTIONS 1 & 2: 0
2. FEELING DOWN, DEPRESSED OR HOPELESS: NOT AT ALL

## 2025-02-03 NOTE — ED PROVIDER NOTES
HPI   Chief Complaint   Patient presents with    Cough     Worsening over 2 weeks       74-year-old female with asthma presents with shortness of breath productive cough body aches fatigue wheezing.  Symptoms for past 2 weeks.  She has been using DuoNeb nebulizer treatments at home without improvement of her symptoms.  Today, she was hypoxic to 85% on room air.  Placed on 2 L nasal cannula oxygen.  No vomiting.      History provided by:  Patient and medical records          Patient History   Past Medical History:   Diagnosis Date    Adhesive capsulitis of right shoulder 06/22/2017    Adhesive capsulitis of both shoulders    Asthma     Mast cell disease     Personal history of diseases of the blood and blood-forming organs and certain disorders involving the immune mechanism     History of anemia    Personal history of other diseases of the musculoskeletal system and connective tissue 11/05/2014    Personal history of arthritis    Personal history of other diseases of the respiratory system 11/05/2014    Personal history of asthma    Personal history of other endocrine, nutritional and metabolic disease 11/05/2014    History of thyroid disease    Personal history of other specified conditions     History of headache    Stroke (Multi)      Past Surgical History:   Procedure Laterality Date    BACK SURGERY  09/23/2015    Back Surgery    HYSTERECTOMY  05/11/2017    Hysterectomy    OTHER SURGICAL HISTORY  01/31/2019    Cataract surgery    TOTAL HIP ARTHROPLASTY  05/11/2017    Hip Replacement    TOTAL KNEE ARTHROPLASTY  09/23/2015    Total Knee Arthroplasty    TUBAL LIGATION  05/11/2017    Tubal Ligation     No family history on file.  Social History     Tobacco Use    Smoking status: Former     Types: Cigarettes    Smokeless tobacco: Never   Vaping Use    Vaping status: Never Used   Substance Use Topics    Alcohol use: Not Currently    Drug use: Never       Physical Exam   ED Triage Vitals [02/03/25 1310]   Temperature  Heart Rate Respirations BP   36.8 °C (98.2 °F) 84 20 152/82      SpO2 Temp Source Heart Rate Source Patient Position   (!) 85 % Tympanic -- --      BP Location FiO2 (%)     -- --       Physical Exam  Vitals and nursing note reviewed.   Constitutional:       General: She is not in acute distress.     Appearance: She is well-developed.   HENT:      Head: Normocephalic and atraumatic.   Eyes:      Conjunctiva/sclera: Conjunctivae normal.   Cardiovascular:      Rate and Rhythm: Normal rate and regular rhythm.      Heart sounds: No murmur heard.  Pulmonary:      Effort: Pulmonary effort is normal. No respiratory distress.      Breath sounds: Wheezing and rhonchi present.   Abdominal:      Palpations: Abdomen is soft.      Tenderness: There is no abdominal tenderness.   Musculoskeletal:         General: No swelling.      Cervical back: Neck supple.   Skin:     General: Skin is warm and dry.      Capillary Refill: Capillary refill takes less than 2 seconds.   Neurological:      General: No focal deficit present.      Mental Status: She is alert and oriented to person, place, and time.      Cranial Nerves: No cranial nerve deficit.      Sensory: No sensory deficit.      Motor: No weakness.   Psychiatric:         Mood and Affect: Mood normal.           ED Course & MDM   ED Course as of 02/06/25 2121 Mon Feb 03, 2025   1341 ECG 12 lead  EKG interpreted by me shows sinus rhythm with rate of 75.  Normal axis.  Normal intervals.  No acute injury pattern [BT]   1425 74-year-old female presents for evaluation of shortness of breath cough fatigue and bodyaches.  Hypoxic to 85% on room air.  Placed on 2 L nasal cannula oxygen.  Labs/sepsis workup. [BT]   1425 XR chest 1 view  Chest x-ray independently interpreted by me shows multifocal pneumonia.  This was confirmed by radiology. [BT]   1426 Multiple antibiotic allergies.  She appears to have hives and rash with cephalosporin and azithromycin.  Will pretreat with Solu-Medrol and  Benadryl. [BT]   1427 She will require hospitalization for acute respiratory failure with hypoxia, multifocal pneumonia, sepsis. [BT]   1641 Lactate normal at 0.8.  No severe sepsis or septic shock.  Troponin 21 x 2.  EKG without acute injury pattern.  Doubt acute coronary syndrome.  She is doing well on 2 L nasal cannula oxygen.  Discussed with medicine hospitalist Melissa Mueller NP who graciously accepted the patient for admission for further evaluation management of respiratory failure hypoxia multifocal pneumonia and sepsis on behalf of Dr. Mcdonald. [BT]      ED Course User Index  [BT] Eavristo Sanders DO         Diagnoses as of 02/06/25 2121   Multifocal pneumonia   Hypoxia   Acute respiratory failure with hypoxia (Multi)   Sepsis, due to unspecified organism, unspecified whether acute organ dysfunction present (Multi)   Multilobar lung infiltrate                 No data recorded     [place] Coma Scale Score: 15 (02/03/25 1318 : Allyson Cole, ISA)                           Medical Decision Making      Procedure  Critical Care    Performed by: Evaristo Sanders DO  Authorized by: Evaristo Sanders DO    Critical care provider statement:     Critical care time (minutes):  30    Critical care start time:  2/3/2025 3:45 PM    Critical care end time:  2/3/2025 4:15 PM    Critical care was necessary to treat or prevent imminent or life-threatening deterioration of the following conditions:  Respiratory failure    Critical care was time spent personally by me on the following activities:  Ordering and review of radiographic studies, ordering and review of laboratory studies, pulse oximetry, re-evaluation of patient's condition, examination of patient and development of treatment plan with patient or surrogate    Care discussed with: admitting provider         Evaristo Sanders DO  02/03/25 1644       Evaristo Sanders DO  02/06/25 2121

## 2025-02-04 LAB
ANION GAP SERPL CALC-SCNC: 8 MMOL/L (ref 10–20)
APPEARANCE UR: CLEAR
ATRIAL RATE: 75 BPM
BILIRUB UR STRIP.AUTO-MCNC: NEGATIVE MG/DL
BUN SERPL-MCNC: 8 MG/DL (ref 6–23)
CALCIUM SERPL-MCNC: 8.5 MG/DL (ref 8.6–10.3)
CHLORIDE SERPL-SCNC: 99 MMOL/L (ref 98–107)
CO2 SERPL-SCNC: 36 MMOL/L (ref 21–32)
COLOR UR: YELLOW
CREAT SERPL-MCNC: 0.36 MG/DL (ref 0.5–1.05)
EGFRCR SERPLBLD CKD-EPI 2021: >90 ML/MIN/1.73M*2
ERYTHROCYTE [DISTWIDTH] IN BLOOD BY AUTOMATED COUNT: 12.7 % (ref 11.5–14.5)
GLUCOSE SERPL-MCNC: 162 MG/DL (ref 74–99)
GLUCOSE UR STRIP.AUTO-MCNC: NEGATIVE MG/DL
HCT VFR BLD AUTO: 41.1 % (ref 36–46)
HGB BLD-MCNC: 12.7 G/DL (ref 12–16)
KETONES UR STRIP.AUTO-MCNC: NEGATIVE MG/DL
LEUKOCYTE ESTERASE UR QL STRIP.AUTO: NEGATIVE
MCH RBC QN AUTO: 30.9 PG (ref 26–34)
MCHC RBC AUTO-ENTMCNC: 30.9 G/DL (ref 32–36)
MCV RBC AUTO: 100 FL (ref 80–100)
NITRITE UR QL STRIP.AUTO: NEGATIVE
NRBC BLD-RTO: 0 /100 WBCS (ref 0–0)
P AXIS: 20 DEGREES
P OFFSET: 202 MS
P ONSET: 137 MS
PH UR STRIP.AUTO: 6 [PH]
PLATELET # BLD AUTO: 338 X10*3/UL (ref 150–450)
POTASSIUM SERPL-SCNC: 4.2 MMOL/L (ref 3.5–5.3)
PR INTERVAL: 162 MS
PROT UR STRIP.AUTO-MCNC: NEGATIVE MG/DL
Q ONSET: 218 MS
QRS COUNT: 13 BEATS
QRS DURATION: 86 MS
QT INTERVAL: 392 MS
QTC CALCULATION(BAZETT): 437 MS
QTC FREDERICIA: 422 MS
R AXIS: 1 DEGREES
RBC # BLD AUTO: 4.11 X10*6/UL (ref 4–5.2)
RBC # UR STRIP.AUTO: NEGATIVE MG/DL
SODIUM SERPL-SCNC: 139 MMOL/L (ref 136–145)
SP GR UR STRIP.AUTO: 1.01
T AXIS: 36 DEGREES
T OFFSET: 414 MS
UROBILINOGEN UR STRIP.AUTO-MCNC: <2 MG/DL
VENTRICULAR RATE: 75 BPM
WBC # BLD AUTO: 10.9 X10*3/UL (ref 4.4–11.3)

## 2025-02-04 PROCEDURE — 99222 1ST HOSP IP/OBS MODERATE 55: CPT | Performed by: NURSE PRACTITIONER

## 2025-02-04 PROCEDURE — 1200000002 HC GENERAL ROOM WITH TELEMETRY DAILY: Mod: IPSPLIT

## 2025-02-04 PROCEDURE — 2500000004 HC RX 250 GENERAL PHARMACY W/ HCPCS (ALT 636 FOR OP/ED): Mod: IPSPLIT | Performed by: NURSE PRACTITIONER

## 2025-02-04 PROCEDURE — 36415 COLL VENOUS BLD VENIPUNCTURE: CPT | Performed by: NURSE PRACTITIONER

## 2025-02-04 PROCEDURE — 85027 COMPLETE CBC AUTOMATED: CPT | Performed by: NURSE PRACTITIONER

## 2025-02-04 PROCEDURE — 2500000002 HC RX 250 W HCPCS SELF ADMINISTERED DRUGS (ALT 637 FOR MEDICARE OP, ALT 636 FOR OP/ED): Performed by: NURSE PRACTITIONER

## 2025-02-04 PROCEDURE — 2500000005 HC RX 250 GENERAL PHARMACY W/O HCPCS: Mod: IPSPLIT | Performed by: NURSE PRACTITIONER

## 2025-02-04 PROCEDURE — 81003 URINALYSIS AUTO W/O SCOPE: CPT | Mod: IPSPLIT | Performed by: EMERGENCY MEDICINE

## 2025-02-04 PROCEDURE — 2500000001 HC RX 250 WO HCPCS SELF ADMINISTERED DRUGS (ALT 637 FOR MEDICARE OP): Performed by: NURSE PRACTITIONER

## 2025-02-04 PROCEDURE — 94640 AIRWAY INHALATION TREATMENT: CPT | Mod: IPSPLIT

## 2025-02-04 PROCEDURE — 80048 BASIC METABOLIC PNL TOTAL CA: CPT | Performed by: NURSE PRACTITIONER

## 2025-02-04 PROCEDURE — 94760 N-INVAS EAR/PLS OXIMETRY 1: CPT

## 2025-02-04 RX ORDER — AZITHROMYCIN 250 MG/1
500 TABLET, FILM COATED ORAL
Status: COMPLETED | OUTPATIENT
Start: 2025-02-04 | End: 2025-02-07

## 2025-02-04 RX ORDER — IPRATROPIUM BROMIDE AND ALBUTEROL SULFATE 2.5; .5 MG/3ML; MG/3ML
3 SOLUTION RESPIRATORY (INHALATION) EVERY 2 HOUR PRN
Status: DISCONTINUED | OUTPATIENT
Start: 2025-02-04 | End: 2025-02-08 | Stop reason: HOSPADM

## 2025-02-04 RX ORDER — CEFTRIAXONE 2 G/50ML
2 INJECTION, SOLUTION INTRAVENOUS EVERY 24 HOURS
Status: COMPLETED | OUTPATIENT
Start: 2025-02-04 | End: 2025-02-05

## 2025-02-04 RX ADMIN — METHYLPREDNISOLONE SODIUM SUCCINATE 40 MG: 40 INJECTION, POWDER, FOR SOLUTION INTRAMUSCULAR; INTRAVENOUS at 21:55

## 2025-02-04 RX ADMIN — CEFTRIAXONE 2 G: 2 INJECTION, SOLUTION INTRAVENOUS at 13:54

## 2025-02-04 RX ADMIN — AZITHROMYCIN DIHYDRATE 500 MG: 250 TABLET ORAL at 13:54

## 2025-02-04 RX ADMIN — METHYLPREDNISOLONE SODIUM SUCCINATE 40 MG: 40 INJECTION, POWDER, FOR SOLUTION INTRAMUSCULAR; INTRAVENOUS at 05:05

## 2025-02-04 RX ADMIN — POTASSIUM CHLORIDE 10 MEQ: 750 TABLET, FILM COATED, EXTENDED RELEASE ORAL at 09:09

## 2025-02-04 RX ADMIN — CLOPIDOGREL BISULFATE 75 MG: 75 TABLET ORAL at 09:09

## 2025-02-04 RX ADMIN — IPRATROPIUM BROMIDE AND ALBUTEROL SULFATE 3 ML: .5; 3 SOLUTION RESPIRATORY (INHALATION) at 22:02

## 2025-02-04 RX ADMIN — Medication 5 L/MIN: at 21:32

## 2025-02-04 RX ADMIN — DIPHENHYDRAMINE HYDROCHLORIDE 25 MG: 25 CAPSULE ORAL at 13:13

## 2025-02-04 RX ADMIN — METHYLPREDNISOLONE SODIUM SUCCINATE 40 MG: 40 INJECTION, POWDER, FOR SOLUTION INTRAMUSCULAR; INTRAVENOUS at 13:55

## 2025-02-04 RX ADMIN — MONTELUKAST 10 MG: 10 TABLET, FILM COATED ORAL at 20:43

## 2025-02-04 RX ADMIN — PANTOPRAZOLE SODIUM 40 MG: 40 TABLET, DELAYED RELEASE ORAL at 05:05

## 2025-02-04 RX ADMIN — LEVOTHYROXINE SODIUM 150 MCG: 75 TABLET ORAL at 05:05

## 2025-02-04 RX ADMIN — ENOXAPARIN SODIUM 40 MG: 40 INJECTION SUBCUTANEOUS at 20:43

## 2025-02-04 ASSESSMENT — PAIN SCALES - GENERAL
PAINLEVEL_OUTOF10: 0 - NO PAIN

## 2025-02-04 ASSESSMENT — PAIN - FUNCTIONAL ASSESSMENT
PAIN_FUNCTIONAL_ASSESSMENT: 0-10

## 2025-02-04 NOTE — CARE PLAN
The patient's goals for the shift include  NA    The clinical goals for the shift include Pt will be stable on 3L O2 or less this shift    Pt utilizing BSC, continent of urine. Tele NSR. No complaints of pain, slight dyspnea on exertion. Stable on 5L O2.

## 2025-02-04 NOTE — CARE PLAN
Patient had no c/o pain, no c/o sob, vs stable, tolerated meals well, up to bsc throughout shift, call bell remained within reach, bed alarm on

## 2025-02-04 NOTE — H&P
History of Present Illness  Padmini Lang is a 74 y.o. female  with PMHx significant for Mast cell disease, anemia, OA, asthma, hypothyroidism, cerebellar CVA, hepatic steatosis  who presented to [facility] due to shortness of breath with a productive cough, generalized weakness, and wheezing that started over the past several weeks. She was found in the ER hypoxic on room air requiring oxygen of 5 liters to maintain oxygen saturation > 90%. She is initiated on rocephin, azithromycin, solumedrol, and duo-neb in the ED and transferred to the F in stable condition. She did not have reaction to the antibiotics throughout the night and thus continued treatment today.     12 Point ROS negative unless noted in above HPI       Past Medical History  Past Medical History:   Diagnosis Date    Adhesive capsulitis of right shoulder 06/22/2017    Adhesive capsulitis of both shoulders    Asthma     Mast cell disease     Personal history of diseases of the blood and blood-forming organs and certain disorders involving the immune mechanism     History of anemia    Personal history of other diseases of the musculoskeletal system and connective tissue 11/05/2014    Personal history of arthritis    Personal history of other diseases of the respiratory system 11/05/2014    Personal history of asthma    Personal history of other endocrine, nutritional and metabolic disease 11/05/2014    History of thyroid disease    Personal history of other specified conditions     History of headache    Stroke (Multi)        Surgical History  Past Surgical History:   Procedure Laterality Date    BACK SURGERY  09/23/2015    Back Surgery    HYSTERECTOMY  05/11/2017    Hysterectomy    OTHER SURGICAL HISTORY  01/31/2019    Cataract surgery    TOTAL HIP ARTHROPLASTY  05/11/2017    Hip Replacement    TOTAL KNEE ARTHROPLASTY  09/23/2015    Total Knee Arthroplasty    TUBAL LIGATION  05/11/2017    Tubal Ligation        Social History  She reports that she  "has quit smoking. Her smoking use included cigarettes. She has never used smokeless tobacco. She reports that she does not currently use alcohol. She reports that she does not use drugs.    Allergies  Aspirin, Azithromycin, Doxycycline, Fish containing products, Naproxen, Nsaids (non-steroidal anti-inflammatory drug), Prochlorperazine, Shellfish containing products, Sulfamethoxazole-trimethoprim, Tree nuts, Acetaminophen, Cephalexin, Clindamycin, Nicorette ds, Iodinated contrast media, Adhesive tape-silicones, Carbamazepine, Carbinoxamine, Ciprofloxacin, Fentanyl, Gabapentin, Lidocaine, Metronidazole, Oxycodone, and Vancomycin     Physical Exam  Constitutional:       Appearance: She is obese. She is ill-appearing.   HENT:      Head: Atraumatic.      Nose: Nose normal.      Mouth/Throat:      Mouth: Mucous membranes are moist.   Eyes:      Pupils: Pupils are equal, round, and reactive to light.   Cardiovascular:      Rate and Rhythm: Normal rate and regular rhythm.      Pulses: Normal pulses.   Pulmonary:      Effort: Pulmonary effort is normal.      Breath sounds: Wheezing (harsh throughout) present.      Comments: Oxygen 5 liters 93%  Abdominal:      General: Bowel sounds are normal.      Palpations: Abdomen is soft.   Musculoskeletal:      Right lower leg: Edema (1+ pitting) present.      Left lower leg: Edema (1+ pitting) present.   Skin:     General: Skin is warm.      Capillary Refill: Capillary refill takes 2 to 3 seconds.      Coloration: Skin is pale.      Comments: PVD BLE   Neurological:      General: No focal deficit present.      Mental Status: She is alert and oriented to person, place, and time.   Psychiatric:         Mood and Affect: Mood normal.          Last Recorded Vitals  Blood pressure 148/68, pulse 65, temperature 36.4 °C (97.5 °F), temperature source Temporal, resp. rate 25, height 1.727 m (5' 7.99\"), weight 111 kg (244 lb 14.9 oz), SpO2 95%.    Relevant Results  Scheduled " medications  azithromycin, 500 mg, oral, q24h LUIS  cefTRIAXone, 2 g, intravenous, q24h  clopidogrel, 75 mg, oral, Daily  enoxaparin, 40 mg, subcutaneous, q24h  tiotropium, 2 puff, inhalation, Daily   And  fluticasone furoate-vilanteroL, 1 puff, inhalation, Daily  [Held by provider] furosemide, 80 mg, oral, Daily  levothyroxine, 150 mcg, oral, Daily  methylPREDNISolone sodium succinate (PF), 40 mg, intravenous, q8h  montelukast, 10 mg, oral, Nightly  oxygen, , inhalation, Continuous - Inhalation  pantoprazole, 40 mg, oral, Daily before breakfast   Or  pantoprazole, 40 mg, intravenous, Daily before breakfast  polyethylene glycol, 17 g, oral, Daily  potassium chloride CR, 10 mEq, oral, Daily      Continuous medications     PRN medications  PRN medications: diphenhydrAMINE, ondansetron ODT **OR** ondansetron     Results for orders placed or performed during the hospital encounter of 02/03/25 (from the past 24 hours)   CBC with Differential   Result Value Ref Range    WBC 11.7 (H) 4.4 - 11.3 x10*3/uL    nRBC 0.0 0.0 - 0.0 /100 WBCs    RBC 4.28 4.00 - 5.20 x10*6/uL    Hemoglobin 13.3 12.0 - 16.0 g/dL    Hematocrit 42.2 36.0 - 46.0 %    MCV 99 80 - 100 fL    MCH 31.1 26.0 - 34.0 pg    MCHC 31.5 (L) 32.0 - 36.0 g/dL    RDW 12.7 11.5 - 14.5 %    Platelets 312 150 - 450 x10*3/uL    Neutrophils % 69.6 40.0 - 80.0 %    Immature Granulocytes %, Automated 0.5 0.0 - 0.9 %    Lymphocytes % 16.3 13.0 - 44.0 %    Monocytes % 11.0 2.0 - 10.0 %    Eosinophils % 2.2 0.0 - 6.0 %    Basophils % 0.4 0.0 - 2.0 %    Neutrophils Absolute 8.12 (H) 1.60 - 5.50 x10*3/uL    Immature Granulocytes Absolute, Automated 0.06 0.00 - 0.50 x10*3/uL    Lymphocytes Absolute 1.90 0.80 - 3.00 x10*3/uL    Monocytes Absolute 1.29 (H) 0.05 - 0.80 x10*3/uL    Eosinophils Absolute 0.26 0.00 - 0.40 x10*3/uL    Basophils Absolute 0.05 0.00 - 0.10 x10*3/uL   Comprehensive Metabolic Panel   Result Value Ref Range    Glucose 109 (H) 74 - 99 mg/dL    Sodium 139 136 -  145 mmol/L    Potassium 3.6 3.5 - 5.3 mmol/L    Chloride 97 (L) 98 - 107 mmol/L    Bicarbonate 37 (H) 21 - 32 mmol/L    Anion Gap 9 (L) 10 - 20 mmol/L    Urea Nitrogen 6 6 - 23 mg/dL    Creatinine 0.45 (L) 0.50 - 1.05 mg/dL    eGFR >90 >60 mL/min/1.73m*2    Calcium 8.6 8.6 - 10.3 mg/dL    Albumin 3.5 3.4 - 5.0 g/dL    Alkaline Phosphatase 57 33 - 136 U/L    Total Protein 6.4 6.4 - 8.2 g/dL    AST 14 9 - 39 U/L    Bilirubin, Total 0.4 0.0 - 1.2 mg/dL    ALT 22 7 - 45 U/L   Brain Natriuretic Peptide   Result Value Ref Range     (H) 0 - 99 pg/mL   Troponin I, High Sensitivity, Initial   Result Value Ref Range    Troponin I, High Sensitivity 21 (H) 0 - 13 ng/L   Light Blue Top   Result Value Ref Range    Extra Tube Hold for add-ons.    Red Top   Result Value Ref Range    Extra Tube Hold for add-ons.    Gray Top   Result Value Ref Range    Extra Tube Hold for add-ons.    Lactate   Result Value Ref Range    Lactate 0.8 0.4 - 2.0 mmol/L   Troponin, High Sensitivity, 1 Hour   Result Value Ref Range    Troponin I, High Sensitivity 21 (H) 0 - 13 ng/L   Blood Culture    Specimen: Peripheral Venipuncture; Blood culture   Result Value Ref Range    Blood Culture Loaded on Instrument - Culture in progress    Blood Culture    Specimen: Peripheral Venipuncture; Blood culture   Result Value Ref Range    Blood Culture Loaded on Instrument - Culture in progress    Sars-CoV-2 and Influenza A/B PCR   Result Value Ref Range    Flu A Result Not Detected Not Detected    Flu B Result Not Detected Not Detected    Coronavirus 2019, PCR Not Detected Not Detected   RSV PCR   Result Value Ref Range    RSV PCR Not Detected Not Detected   CBC   Result Value Ref Range    WBC 10.9 4.4 - 11.3 x10*3/uL    nRBC 0.0 0.0 - 0.0 /100 WBCs    RBC 4.11 4.00 - 5.20 x10*6/uL    Hemoglobin 12.7 12.0 - 16.0 g/dL    Hematocrit 41.1 36.0 - 46.0 %     80 - 100 fL    MCH 30.9 26.0 - 34.0 pg    MCHC 30.9 (L) 32.0 - 36.0 g/dL    RDW 12.7 11.5 - 14.5 %     Platelets 338 150 - 450 x10*3/uL   Basic metabolic panel   Result Value Ref Range    Glucose 162 (H) 74 - 99 mg/dL    Sodium 139 136 - 145 mmol/L    Potassium 4.2 3.5 - 5.3 mmol/L    Chloride 99 98 - 107 mmol/L    Bicarbonate 36 (H) 21 - 32 mmol/L    Anion Gap 8 (L) 10 - 20 mmol/L    Urea Nitrogen 8 6 - 23 mg/dL    Creatinine 0.36 (L) 0.50 - 1.05 mg/dL    eGFR >90 >60 mL/min/1.73m*2    Calcium 8.5 (L) 8.6 - 10.3 mg/dL        Imaging  XR chest 1 view    Result Date: 2/3/2025  Interpreted By:  Skip Hutchinson, STUDY: XR CHEST 1 VIEW;  2/3/2025 2:01 pm   INDICATION: Signs/Symptoms:Chest Pain.     COMPARISON: 08/17/2022   ACCESSION NUMBER(S): [id number]   ORDERING CLINICIAN: ANISHA RUSSELL   FINDINGS:         CARDIOMEDIASTINAL SILHOUETTE: Cardiomediastinal silhouette is moderately enlarged.   LUNGS: There is dense airspace disease at the right lung base. There is patchy left basilar airspace is well. Bilateral small effusions present. A component of mild edema is present as well   ABDOMEN: No remarkable upper abdominal findings.   BONES: No acute osseous changes.       1.  Multifocal airspace disease worse at the right lung base along with small effusions. Underlying multifocal pneumonia is suspected. Radiographic follow-up to resolution in 2-3 weeks is advised 2. Enlargement of the cardiac silhouette with a component of mild pulmonary edema.       MACRO: None   Signed by: Skip Hutchinson 2/3/2025 2:08 PM Dictation workstation:   [id number]      Assessment/Plan  Padmini Lang is a 74 y.o. female  with PMHx significant for Mast cell disease, anemia, OA, asthma, hypothyroidism, cerebellar CVA, hepatic steatosis  who presented to [facility] due to shortness of breath with a productive cough, generalized weakness, and wheezing that started over the past several weeks. She was found in the ER hypoxic on room air requiring oxygen of 5 liters to maintain oxygen saturation > 90%. She is initiated on rocephin,  azithromycin, solumedrol, and duo-neb in the ED and transferred to the [facility] in stable condition. She did not have reaction to the antibiotics throughout the night and thus continued treatment today.     #Acute respiratory failure with hypoxia  #Multifocal PNA  #Asthma  ~CT chest: Multifocal airspace disease worse at the right lung base along with small effusions. Underlying multifocal pneumonia is suspected.   ~continue rocephin, azithromycin, solumedrol  ~continue duonebs  ~Home medications of singular, trelegy equivalent.   ~wean oxygen as able.     #Mast cell disease  ~increased sensitivity to medications  ~give benadryl with antibiotics.     #Hypothyroidism  ~continue synthroid 150 mcg    #Edema  #altered fluid status  ~CT chest: Enlargement of the cardiac silhouette with a component of mild pulmonary edema.   ~2018 ECHO EF 55-60%  ~furosemide 40 mg daily start 2/5/25  ~potassium replacement.   ~monitor fluid status    #HX cerebellar CVA  ~Plavix    Disposition: Admitted 2/2 PNA. Will require > 2 midnights.     I spent 55 minutes in the professional and overall care of this patient.      Melissa Mueller, APRN-CNP  Internal Medicine

## 2025-02-04 NOTE — PROGRESS NOTES
02/04/25 1026   Discharge Planning   Living Arrangements Spouse/significant other   Support Systems Spouse/significant other   Assistance Needed None   Type of Residence Private residence   Expected Discharge Disposition Home   Stroke Family Assessment   Stroke Family Assessment Needed No   Intensity of Service   Intensity of Service 0-30 min     Care Transition Progress Note 2/4/25    Patient: Padmini Lang    Diagnosis: Multilobar lung infiltrate    Patient Concerns: Pt reports no concerns today.     Patient Needs: No needs identified.     Care Transition Social Worker explained discharge process today. ANN MARIE DUMONT

## 2025-02-05 LAB
ANION GAP SERPL CALC-SCNC: 8 MMOL/L (ref 10–20)
BUN SERPL-MCNC: 10 MG/DL (ref 6–23)
CALCIUM SERPL-MCNC: 8.7 MG/DL (ref 8.6–10.3)
CHLORIDE SERPL-SCNC: 100 MMOL/L (ref 98–107)
CO2 SERPL-SCNC: 39 MMOL/L (ref 21–32)
CREAT SERPL-MCNC: 0.53 MG/DL (ref 0.5–1.05)
EGFRCR SERPLBLD CKD-EPI 2021: >90 ML/MIN/1.73M*2
ERYTHROCYTE [DISTWIDTH] IN BLOOD BY AUTOMATED COUNT: 12.7 % (ref 11.5–14.5)
GLUCOSE SERPL-MCNC: 159 MG/DL (ref 74–99)
HCT VFR BLD AUTO: 43.3 % (ref 36–46)
HGB BLD-MCNC: 13.2 G/DL (ref 12–16)
HOLD SPECIMEN: NORMAL
MCH RBC QN AUTO: 31.1 PG (ref 26–34)
MCHC RBC AUTO-ENTMCNC: 30.5 G/DL (ref 32–36)
MCV RBC AUTO: 102 FL (ref 80–100)
NRBC BLD-RTO: 0 /100 WBCS (ref 0–0)
PLATELET # BLD AUTO: 387 X10*3/UL (ref 150–450)
POTASSIUM SERPL-SCNC: 4.4 MMOL/L (ref 3.5–5.3)
RBC # BLD AUTO: 4.25 X10*6/UL (ref 4–5.2)
SODIUM SERPL-SCNC: 143 MMOL/L (ref 136–145)
WBC # BLD AUTO: 16 X10*3/UL (ref 4.4–11.3)

## 2025-02-05 PROCEDURE — 94760 N-INVAS EAR/PLS OXIMETRY 1: CPT | Mod: IPSPLIT

## 2025-02-05 PROCEDURE — 36415 COLL VENOUS BLD VENIPUNCTURE: CPT | Mod: IPSPLIT | Performed by: NURSE PRACTITIONER

## 2025-02-05 PROCEDURE — 2500000005 HC RX 250 GENERAL PHARMACY W/O HCPCS: Mod: IPSPLIT | Performed by: NURSE PRACTITIONER

## 2025-02-05 PROCEDURE — 99232 SBSQ HOSP IP/OBS MODERATE 35: CPT | Performed by: NURSE PRACTITIONER

## 2025-02-05 PROCEDURE — 2500000001 HC RX 250 WO HCPCS SELF ADMINISTERED DRUGS (ALT 637 FOR MEDICARE OP): Mod: IPSPLIT | Performed by: NURSE PRACTITIONER

## 2025-02-05 PROCEDURE — 2500000002 HC RX 250 W HCPCS SELF ADMINISTERED DRUGS (ALT 637 FOR MEDICARE OP, ALT 636 FOR OP/ED): Mod: IPSPLIT | Performed by: NURSE PRACTITIONER

## 2025-02-05 PROCEDURE — 2500000004 HC RX 250 GENERAL PHARMACY W/ HCPCS (ALT 636 FOR OP/ED): Mod: IPSPLIT | Performed by: NURSE PRACTITIONER

## 2025-02-05 PROCEDURE — 94640 AIRWAY INHALATION TREATMENT: CPT | Mod: IPSPLIT

## 2025-02-05 PROCEDURE — 80048 BASIC METABOLIC PNL TOTAL CA: CPT | Mod: IPSPLIT | Performed by: NURSE PRACTITIONER

## 2025-02-05 PROCEDURE — 85027 COMPLETE CBC AUTOMATED: CPT | Mod: IPSPLIT | Performed by: NURSE PRACTITIONER

## 2025-02-05 PROCEDURE — 1200000002 HC GENERAL ROOM WITH TELEMETRY DAILY: Mod: IPSPLIT

## 2025-02-05 PROCEDURE — 2500000001 HC RX 250 WO HCPCS SELF ADMINISTERED DRUGS (ALT 637 FOR MEDICARE OP): Mod: IPSPLIT | Performed by: STUDENT IN AN ORGANIZED HEALTH CARE EDUCATION/TRAINING PROGRAM

## 2025-02-05 RX ORDER — CEFUROXIME AXETIL 250 MG/1
500 TABLET ORAL 2 TIMES DAILY
Status: DISCONTINUED | OUTPATIENT
Start: 2025-02-06 | End: 2025-02-08 | Stop reason: HOSPADM

## 2025-02-05 RX ORDER — POLYETHYLENE GLYCOL 3350 17 G/17G
17 POWDER, FOR SOLUTION ORAL DAILY PRN
Status: DISCONTINUED | OUTPATIENT
Start: 2025-02-05 | End: 2025-02-08 | Stop reason: HOSPADM

## 2025-02-05 RX ORDER — BENZONATATE 100 MG/1
200 CAPSULE ORAL 3 TIMES DAILY PRN
Status: DISCONTINUED | OUTPATIENT
Start: 2025-02-05 | End: 2025-02-08 | Stop reason: HOSPADM

## 2025-02-05 RX ORDER — DIPHENHYDRAMINE HCL 25 MG
25 CAPSULE ORAL 2 TIMES DAILY
Status: DISCONTINUED | OUTPATIENT
Start: 2025-02-06 | End: 2025-02-08 | Stop reason: HOSPADM

## 2025-02-05 RX ORDER — FUROSEMIDE 40 MG/1
80 TABLET ORAL DAILY
Status: DISCONTINUED | OUTPATIENT
Start: 2025-02-05 | End: 2025-02-08 | Stop reason: HOSPADM

## 2025-02-05 RX ORDER — GUAIFENESIN 100 MG/5ML
10 SOLUTION ORAL 4 TIMES DAILY PRN
Status: DISCONTINUED | OUTPATIENT
Start: 2025-02-05 | End: 2025-02-07

## 2025-02-05 RX ORDER — CEFUROXIME AXETIL 250 MG/1
250 TABLET ORAL 2 TIMES DAILY
Status: DISCONTINUED | OUTPATIENT
Start: 2025-02-06 | End: 2025-02-05

## 2025-02-05 RX ADMIN — Medication 5 L/MIN: at 08:42

## 2025-02-05 RX ADMIN — PANTOPRAZOLE SODIUM 40 MG: 40 TABLET, DELAYED RELEASE ORAL at 06:05

## 2025-02-05 RX ADMIN — METHYLPREDNISOLONE SODIUM SUCCINATE 40 MG: 40 INJECTION, POWDER, FOR SOLUTION INTRAMUSCULAR; INTRAVENOUS at 14:03

## 2025-02-05 RX ADMIN — FUROSEMIDE 80 MG: 40 TABLET ORAL at 08:30

## 2025-02-05 RX ADMIN — SALINE NASAL SPRAY 1 SPRAY: 1.5 SOLUTION NASAL at 22:51

## 2025-02-05 RX ADMIN — GUAIFENESIN 10 ML: 200 SOLUTION ORAL at 22:38

## 2025-02-05 RX ADMIN — BENZONATATE 200 MG: 100 CAPSULE ORAL at 22:37

## 2025-02-05 RX ADMIN — CEFTRIAXONE 2 G: 2 INJECTION, SOLUTION INTRAVENOUS at 11:46

## 2025-02-05 RX ADMIN — METHYLPREDNISOLONE SODIUM SUCCINATE 40 MG: 40 INJECTION, POWDER, FOR SOLUTION INTRAMUSCULAR; INTRAVENOUS at 21:37

## 2025-02-05 RX ADMIN — IPRATROPIUM BROMIDE AND ALBUTEROL SULFATE 3 ML: .5; 3 SOLUTION RESPIRATORY (INHALATION) at 08:40

## 2025-02-05 RX ADMIN — LEVOTHYROXINE SODIUM 150 MCG: 75 TABLET ORAL at 06:05

## 2025-02-05 RX ADMIN — METHYLPREDNISOLONE SODIUM SUCCINATE 40 MG: 40 INJECTION, POWDER, FOR SOLUTION INTRAMUSCULAR; INTRAVENOUS at 06:05

## 2025-02-05 RX ADMIN — POTASSIUM CHLORIDE 10 MEQ: 750 TABLET, FILM COATED, EXTENDED RELEASE ORAL at 08:30

## 2025-02-05 RX ADMIN — CLOPIDOGREL BISULFATE 75 MG: 75 TABLET ORAL at 08:30

## 2025-02-05 RX ADMIN — DIPHENHYDRAMINE HYDROCHLORIDE 25 MG: 25 CAPSULE ORAL at 11:46

## 2025-02-05 RX ADMIN — AZITHROMYCIN DIHYDRATE 500 MG: 250 TABLET ORAL at 08:29

## 2025-02-05 RX ADMIN — Medication 3 L/MIN: at 19:33

## 2025-02-05 RX ADMIN — MONTELUKAST 10 MG: 10 TABLET, FILM COATED ORAL at 21:37

## 2025-02-05 RX ADMIN — ENOXAPARIN SODIUM 40 MG: 40 INJECTION SUBCUTANEOUS at 21:37

## 2025-02-05 ASSESSMENT — PAIN SCALES - GENERAL
PAINLEVEL_OUTOF10: 0 - NO PAIN

## 2025-02-05 ASSESSMENT — PAIN - FUNCTIONAL ASSESSMENT
PAIN_FUNCTIONAL_ASSESSMENT: 0-10
PAIN_FUNCTIONAL_ASSESSMENT: 0-10

## 2025-02-05 NOTE — PROGRESS NOTES
Padmini Lang is a 74 y.o. female on day 1 of admission presenting with Multilobar lung infiltrate.      Subjective   Padmini is seen in her room. She appears greatly improved from previous day.   She is found on oxygen of 5 liters that is increased from documented 2 liters.   Continue current medications as ordered and pulmonary toileting.   Patient is encouraged to get out of bed.        Objective     Last Recorded Vitals  /61 (BP Location: Right arm, Patient Position: Lying)   Pulse 62   Temp 36.4 °C (97.6 °F) (Temporal)   Resp 20   Wt 111 kg (244 lb 14.9 oz)   SpO2 96%   Intake/Output last 3 Shifts:    Intake/Output Summary (Last 24 hours) at 2/5/2025 1050  Last data filed at 2/5/2025 0900  Gross per 24 hour   Intake 1970 ml   Output 650 ml   Net 1320 ml       Admission Weight  Weight: 111 kg (244 lb) (02/03/25 1310)    Daily Weight  02/04/25 : 111 kg (244 lb 14.9 oz)    Image Results  ECG 12 lead  Normal sinus rhythm  Normal ECG  When compared with ECG of 17-AUG-2022 11:44,  Previous ECG has undetermined rhythm, needs review    Results for orders placed or performed during the hospital encounter of 02/03/25 (from the past 24 hours)   Urinalysis with Reflex Culture and Microscopic   Result Value Ref Range    Color, Urine Yellow Straw, Yellow    Appearance, Urine Clear Clear    Specific Gravity, Urine 1.014 1.005 - 1.035    pH, Urine 6.0 5.0, 5.5, 6.0, 6.5, 7.0, 7.5, 8.0    Protein, Urine NEGATIVE NEGATIVE mg/dL    Glucose, Urine NEGATIVE NEGATIVE mg/dL    Blood, Urine NEGATIVE NEGATIVE mg/dL    Ketones, Urine NEGATIVE NEGATIVE mg/dL    Bilirubin, Urine NEGATIVE NEGATIVE mg/dL    Urobilinogen, Urine <2.0 <2.0 mg/dL    Nitrite, Urine NEGATIVE NEGATIVE    Leukocyte Esterase, Urine NEGATIVE NEGATIVE   Extra Urine Gray Tube   Result Value Ref Range    Extra Tube Hold for add-ons.    CBC   Result Value Ref Range    WBC 16.0 (H) 4.4 - 11.3 x10*3/uL    nRBC 0.0 0.0 - 0.0 /100 WBCs    RBC 4.25 4.00 - 5.20  x10*6/uL    Hemoglobin 13.2 12.0 - 16.0 g/dL    Hematocrit 43.3 36.0 - 46.0 %     (H) 80 - 100 fL    MCH 31.1 26.0 - 34.0 pg    MCHC 30.5 (L) 32.0 - 36.0 g/dL    RDW 12.7 11.5 - 14.5 %    Platelets 387 150 - 450 x10*3/uL   Basic metabolic panel   Result Value Ref Range    Glucose 159 (H) 74 - 99 mg/dL    Sodium 143 136 - 145 mmol/L    Potassium 4.4 3.5 - 5.3 mmol/L    Chloride 100 98 - 107 mmol/L    Bicarbonate 39 (H) 21 - 32 mmol/L    Anion Gap 8 (L) 10 - 20 mmol/L    Urea Nitrogen 10 6 - 23 mg/dL    Creatinine 0.53 0.50 - 1.05 mg/dL    eGFR >90 >60 mL/min/1.73m*2    Calcium 8.7 8.6 - 10.3 mg/dL     Physical Exam  Constitutional:       Appearance: She is obese. She is ill-appearing.   HENT:      Head: Atraumatic.      Nose: Nose normal.      Mouth/Throat:      Mouth: Mucous membranes are moist.   Eyes:      Pupils: Pupils are equal, round, and reactive to light.   Cardiovascular:      Rate and Rhythm: Normal rate and regular rhythm.      Pulses: Normal pulses.   Pulmonary:      Effort: Pulmonary effort is normal.      Breath sounds: rales bilateral bases. Congested cough.      Comments: Oxygen 5 liters 96%  Abdominal:      General: Bowel sounds are normal.      Palpations: Abdomen is soft.   Musculoskeletal:      Right lower leg: Edema (1+ pitting) present.      Left lower leg: Edema (1+ pitting) present.   Skin:     General: Skin is warm.      Capillary Refill: Capillary refill takes 2 to 3 seconds.      Coloration: Skin is pale.      Comments: PVD BLE   Neurological:      General: No focal deficit present.      Mental Status: She is alert and oriented to person, place, and time.   Psychiatric:         Mood and Affect: Mood normal.     Scheduled medications  azithromycin, 500 mg, oral, q24h LUIS  cefTRIAXone, 2 g, intravenous, q24h  clopidogrel, 75 mg, oral, Daily  enoxaparin, 40 mg, subcutaneous, q24h  furosemide, 80 mg, oral, Daily  levothyroxine, 150 mcg, oral, Daily  methylPREDNISolone sodium succinate  (PF), 40 mg, intravenous, q8h  montelukast, 10 mg, oral, Nightly  oxygen, , inhalation, Continuous - Inhalation  pantoprazole, 40 mg, oral, Daily before breakfast   Or  pantoprazole, 40 mg, intravenous, Daily before breakfast  polyethylene glycol, 17 g, oral, Daily  potassium chloride CR, 10 mEq, oral, Daily      Continuous medications     PRN medications  PRN medications: diphenhydrAMINE, ipratropium-albuteroL, ondansetron ODT **OR** ondansetron    Assessment/Plan    Padmini Lang is a 74 y.o. female  with PMHx significant for Mast cell disease, anemia, OA, asthma, hypothyroidism, cerebellar CVA, hepatic steatosis  who presented to [facility] due to shortness of breath with a productive cough, generalized weakness, and wheezing that started over the past several weeks. She was found in the ER hypoxic on room air requiring oxygen of 5 liters to maintain oxygen saturation > 90%. She is initiated on rocephin, azithromycin, solumedrol, and duo-neb in the ED and transferred to the [facility] in stable condition. She did not have reaction to the antibiotics throughout the night and thus continued treatment today.      #Acute respiratory failure with hypoxia  #Multifocal PNA  #Asthma  ~CT chest: Multifocal airspace disease worse at the right lung base along with small effusions. Underlying multifocal pneumonia is suspected.   ~continue rocephin, azithromycin, solumedrol  ~continue duonebs  ~Home medications of singular, trelegy equivalent.   ~wean oxygen as able.   ~transition solumedrol to prednisone taper  ~transition to ceftin     #Mast cell disease  ~increased sensitivity to medications  ~give benadryl with antibiotics.      #Hypothyroidism  ~continue synthroid 150 mcg     #Edema  #altered fluid status  ~CT chest: Enlargement of the cardiac silhouette with a component of mild pulmonary edema.   ~2018 ECHO EF 55-60%  ~furosemide 40 mg daily start 2/5/25  ~potassium replacement.   ~monitor fluid status     #HX cerebellar  CVA  ~Plavix     Disposition: Admitted 2/2 PNA. Will require > 2 midnights.     Seen in collaboration with Dr. Hao Mcdonald MD     I spent 25 minutes in the professional and overall care of this patient.    Melissa Mueller, LEO-CNP

## 2025-02-05 NOTE — CARE PLAN
The patient's goals for the shift include      The clinical goals for the shift include Patient will maintain Sp02 greater than 92% on 5L or less through this shift     No changes since previous note. Patient remains stable on 5L NC, Sp02 mid 90's

## 2025-02-05 NOTE — CARE PLAN
The patient's goals for the shift include      The clinical goals for the shift include Patient will maintain SpO2 > 90% with supplemental O2 through 1900    Over the shift, the patient did meet goal. Patient had an uneventful shift. Patient was able to use BSC independently in room without difficulty. Will continue with POC, call light in reach.

## 2025-02-05 NOTE — NURSING NOTE
Patient has had an uneventful shift. She remains on 5L NC, lungs decreased with expiratory wheezing that improves with SA TX's. Continues with a productive cough, thick green/yellow sputum per the patient. She is SOB with exertion. Using the BSC with minimal assistance. VSS, afebrile.

## 2025-02-06 PROBLEM — J45.21 MILD INTERMITTENT ASTHMA WITH EXACERBATION (HHS-HCC): Status: ACTIVE | Noted: 2025-02-06

## 2025-02-06 PROBLEM — D47.09 MAST CELL DISEASE: Status: ACTIVE | Noted: 2025-02-06

## 2025-02-06 PROBLEM — T50.2X5A DIURETIC-INDUCED HYPOKALEMIA: Status: ACTIVE | Noted: 2025-02-06

## 2025-02-06 PROBLEM — J15.9 COMMUNITY ACQUIRED BACTERIAL PNEUMONIA: Status: ACTIVE | Noted: 2025-02-06

## 2025-02-06 PROBLEM — J96.01 ACUTE RESPIRATORY FAILURE WITH HYPOXIA (MULTI): Status: ACTIVE | Noted: 2025-02-06

## 2025-02-06 PROBLEM — E87.6 DIURETIC-INDUCED HYPOKALEMIA: Status: ACTIVE | Noted: 2025-02-06

## 2025-02-06 PROBLEM — E03.9 HYPOTHYROIDISM (ACQUIRED): Status: ACTIVE | Noted: 2025-02-06

## 2025-02-06 PROBLEM — I50.33 ACUTE ON CHRONIC DIASTOLIC HEART FAILURE: Status: ACTIVE | Noted: 2025-02-06

## 2025-02-06 PROBLEM — Z86.73 CEREBELLAR CEREBROVASCULAR ACCIDENT (CVA) WITHOUT LATE EFFECT: Status: ACTIVE | Noted: 2025-02-06

## 2025-02-06 LAB
ANION GAP SERPL CALC-SCNC: 10 MMOL/L (ref 10–20)
BUN SERPL-MCNC: 12 MG/DL (ref 6–23)
CALCIUM SERPL-MCNC: 9.2 MG/DL (ref 8.6–10.3)
CHLORIDE SERPL-SCNC: 92 MMOL/L (ref 98–107)
CO2 SERPL-SCNC: 41 MMOL/L (ref 21–32)
CREAT SERPL-MCNC: 0.55 MG/DL (ref 0.5–1.05)
EGFRCR SERPLBLD CKD-EPI 2021: >90 ML/MIN/1.73M*2
ERYTHROCYTE [DISTWIDTH] IN BLOOD BY AUTOMATED COUNT: 12.6 % (ref 11.5–14.5)
GLUCOSE SERPL-MCNC: 131 MG/DL (ref 74–99)
HCT VFR BLD AUTO: 47.5 % (ref 36–46)
HGB BLD-MCNC: 14.7 G/DL (ref 12–16)
MCH RBC QN AUTO: 30.9 PG (ref 26–34)
MCHC RBC AUTO-ENTMCNC: 30.9 G/DL (ref 32–36)
MCV RBC AUTO: 100 FL (ref 80–100)
NRBC BLD-RTO: 0 /100 WBCS (ref 0–0)
PLATELET # BLD AUTO: 410 X10*3/UL (ref 150–450)
POTASSIUM SERPL-SCNC: 4.2 MMOL/L (ref 3.5–5.3)
RBC # BLD AUTO: 4.75 X10*6/UL (ref 4–5.2)
SODIUM SERPL-SCNC: 139 MMOL/L (ref 136–145)
WBC # BLD AUTO: 16.6 X10*3/UL (ref 4.4–11.3)

## 2025-02-06 PROCEDURE — 2500000001 HC RX 250 WO HCPCS SELF ADMINISTERED DRUGS (ALT 637 FOR MEDICARE OP): Mod: IPSPLIT | Performed by: NURSE PRACTITIONER

## 2025-02-06 PROCEDURE — 2500000004 HC RX 250 GENERAL PHARMACY W/ HCPCS (ALT 636 FOR OP/ED): Mod: JZ,TB,IPSPLIT | Performed by: NURSE PRACTITIONER

## 2025-02-06 PROCEDURE — 2500000002 HC RX 250 W HCPCS SELF ADMINISTERED DRUGS (ALT 637 FOR MEDICARE OP, ALT 636 FOR OP/ED): Mod: IPSPLIT | Performed by: NURSE PRACTITIONER

## 2025-02-06 PROCEDURE — 99232 SBSQ HOSP IP/OBS MODERATE 35: CPT | Performed by: NURSE PRACTITIONER

## 2025-02-06 PROCEDURE — 80048 BASIC METABOLIC PNL TOTAL CA: CPT | Mod: IPSPLIT | Performed by: NURSE PRACTITIONER

## 2025-02-06 PROCEDURE — 2500000005 HC RX 250 GENERAL PHARMACY W/O HCPCS: Mod: IPSPLIT | Performed by: INTERNAL MEDICINE

## 2025-02-06 PROCEDURE — 94640 AIRWAY INHALATION TREATMENT: CPT | Mod: IPSPLIT

## 2025-02-06 PROCEDURE — 36415 COLL VENOUS BLD VENIPUNCTURE: CPT | Mod: IPSPLIT | Performed by: NURSE PRACTITIONER

## 2025-02-06 PROCEDURE — 94760 N-INVAS EAR/PLS OXIMETRY 1: CPT | Mod: IPSPLIT

## 2025-02-06 PROCEDURE — 1200000002 HC GENERAL ROOM WITH TELEMETRY DAILY: Mod: IPSPLIT

## 2025-02-06 PROCEDURE — 2500000001 HC RX 250 WO HCPCS SELF ADMINISTERED DRUGS (ALT 637 FOR MEDICARE OP): Mod: IPSPLIT | Performed by: STUDENT IN AN ORGANIZED HEALTH CARE EDUCATION/TRAINING PROGRAM

## 2025-02-06 PROCEDURE — 85027 COMPLETE CBC AUTOMATED: CPT | Mod: IPSPLIT | Performed by: NURSE PRACTITIONER

## 2025-02-06 RX ADMIN — BENZONATATE 200 MG: 100 CAPSULE ORAL at 05:57

## 2025-02-06 RX ADMIN — PANTOPRAZOLE SODIUM 40 MG: 40 TABLET, DELAYED RELEASE ORAL at 05:46

## 2025-02-06 RX ADMIN — POTASSIUM CHLORIDE 10 MEQ: 750 TABLET, FILM COATED, EXTENDED RELEASE ORAL at 08:58

## 2025-02-06 RX ADMIN — CEFUROXIME AXETIL 500 MG: 250 TABLET, FILM COATED ORAL at 21:05

## 2025-02-06 RX ADMIN — CLOPIDOGREL BISULFATE 75 MG: 75 TABLET ORAL at 08:59

## 2025-02-06 RX ADMIN — DIPHENHYDRAMINE HYDROCHLORIDE 25 MG: 25 CAPSULE ORAL at 08:58

## 2025-02-06 RX ADMIN — DIPHENHYDRAMINE HYDROCHLORIDE 25 MG: 25 CAPSULE ORAL at 05:57

## 2025-02-06 RX ADMIN — CEFUROXIME AXETIL 500 MG: 250 TABLET, FILM COATED ORAL at 08:58

## 2025-02-06 RX ADMIN — METHYLPREDNISOLONE SODIUM SUCCINATE 40 MG: 40 INJECTION, POWDER, FOR SOLUTION INTRAMUSCULAR; INTRAVENOUS at 05:46

## 2025-02-06 RX ADMIN — AZITHROMYCIN DIHYDRATE 500 MG: 250 TABLET ORAL at 08:59

## 2025-02-06 RX ADMIN — DIPHENHYDRAMINE HYDROCHLORIDE 25 MG: 25 CAPSULE ORAL at 21:05

## 2025-02-06 RX ADMIN — BENZONATATE 200 MG: 100 CAPSULE ORAL at 21:05

## 2025-02-06 RX ADMIN — FUROSEMIDE 80 MG: 40 TABLET ORAL at 08:59

## 2025-02-06 RX ADMIN — METHYLPREDNISOLONE SODIUM SUCCINATE 40 MG: 40 INJECTION, POWDER, FOR SOLUTION INTRAMUSCULAR; INTRAVENOUS at 17:53

## 2025-02-06 RX ADMIN — ENOXAPARIN SODIUM 40 MG: 40 INJECTION SUBCUTANEOUS at 21:14

## 2025-02-06 RX ADMIN — MONTELUKAST 10 MG: 10 TABLET, FILM COATED ORAL at 21:05

## 2025-02-06 RX ADMIN — IPRATROPIUM BROMIDE AND ALBUTEROL SULFATE 3 ML: .5; 3 SOLUTION RESPIRATORY (INHALATION) at 01:16

## 2025-02-06 RX ADMIN — LEVOTHYROXINE SODIUM 150 MCG: 75 TABLET ORAL at 05:46

## 2025-02-06 RX ADMIN — Medication 2 L/MIN: at 20:36

## 2025-02-06 SDOH — ECONOMIC STABILITY: FOOD INSECURITY: WITHIN THE PAST 12 MONTHS, YOU WORRIED THAT YOUR FOOD WOULD RUN OUT BEFORE YOU GOT MONEY TO BUY MORE.: NEVER TRUE

## 2025-02-06 SDOH — ECONOMIC STABILITY: HOUSING INSECURITY
IN THE LAST 12 MONTHS, WAS THERE A TIME WHEN YOU DID NOT HAVE A STEADY PLACE TO SLEEP OR SLEPT IN A SHELTER (INCLUDING NOW)?: NO

## 2025-02-06 SDOH — ECONOMIC STABILITY: HOUSING INSECURITY: IN THE LAST 12 MONTHS, HOW MANY PLACES HAVE YOU LIVED?: 1

## 2025-02-06 SDOH — ECONOMIC STABILITY: HOUSING INSECURITY

## 2025-02-06 SDOH — ECONOMIC STABILITY: TRANSPORTATION INSECURITY
IN THE PAST 12 MONTHS, HAS LACK OF TRANSPORTATION KEPT YOU FROM MEETINGS, WORK, OR FROM GETTING THINGS NEEDED FOR DAILY LIVING?: NO

## 2025-02-06 SDOH — ECONOMIC STABILITY: TRANSPORTATION INSECURITY
IN THE PAST 12 MONTHS, HAS THE LACK OF TRANSPORTATION KEPT YOU FROM MEDICAL APPOINTMENTS OR FROM GETTING MEDICATIONS?: NO

## 2025-02-06 SDOH — ECONOMIC STABILITY: INCOME INSECURITY: IN THE LAST 12 MONTHS, WAS THERE A TIME WHEN YOU WERE NOT ABLE TO PAY THE MORTGAGE OR RENT ON TIME?: NO

## 2025-02-06 SDOH — ECONOMIC STABILITY: TRANSPORTATION INSECURITY

## 2025-02-06 SDOH — ECONOMIC STABILITY: FOOD INSECURITY: WITHIN THE PAST 12 MONTHS, THE FOOD YOU BOUGHT JUST DIDN'T LAST AND YOU DIDN'T HAVE MONEY TO GET MORE.: NEVER TRUE

## 2025-02-06 SDOH — ECONOMIC STABILITY: HOUSING INSECURITY: IN THE LAST 12 MONTHS, WAS THERE A TIME WHEN YOU WERE NOT ABLE TO PAY THE MORTGAGE OR RENT ON TIME?: NO

## 2025-02-06 SDOH — ECONOMIC STABILITY: HOUSING INSECURITY: IN THE PAST 12 MONTHS HAS THE ELECTRIC, GAS, OIL, OR WATER COMPANY THREATENED TO SHUT OFF SERVICES IN YOUR HOME?: NO

## 2025-02-06 SDOH — ECONOMIC STABILITY: FOOD INSECURITY

## 2025-02-06 SDOH — ECONOMIC STABILITY: FOOD INSECURITY: WITHIN THE PAST 12 MONTHS, YOU WORRIED THAT YOUR FOOD WOULD RUN OUT BEFORE YOU GOT THE MONEY TO BUY MORE.: NEVER TRUE

## 2025-02-06 SDOH — ECONOMIC STABILITY: GENERAL

## 2025-02-06 SDOH — ECONOMIC STABILITY: TRANSPORTATION INSECURITY: IN THE PAST 12 MONTHS, HAS LACK OF TRANSPORTATION KEPT YOU FROM MEDICAL APPOINTMENTS OR FROM GETTING MEDICATIONS?: NO

## 2025-02-06 ASSESSMENT — PAIN SCALES - GENERAL
PAINLEVEL_OUTOF10: 0 - NO PAIN
PAINLEVEL_OUTOF10: 0 - NO PAIN

## 2025-02-06 ASSESSMENT — PAIN - FUNCTIONAL ASSESSMENT: PAIN_FUNCTIONAL_ASSESSMENT: 0-10

## 2025-02-06 ASSESSMENT — SOCIAL DETERMINANTS OF HEALTH (SDOH): IN THE PAST 12 MONTHS, HAS THE ELECTRIC, GAS, OIL, OR WATER COMPANY THREATENED TO SHUT OFF SERVICE IN YOUR HOME?: NO

## 2025-02-06 ASSESSMENT — ACTIVITIES OF DAILY LIVING (ADL): LACK_OF_TRANSPORTATION: NO

## 2025-02-06 NOTE — PROGRESS NOTES
02/06/25 1249   Discharge Planning   Living Arrangements Spouse/significant other   Support Systems Spouse/significant other   Assistance Needed No needs   Type of Residence Private residence   Expected Discharge Disposition Home   Does the patient need discharge transport arranged? No   Intensity of Service   Intensity of Service 0-30 min     [facility]  22; pt uses walker at home and feels she is at baseline. Her spouse will transport her home at  discharge. ANN MARIE Villalta

## 2025-02-06 NOTE — CARE PLAN
The patient's goals for the shift include      The clinical goals for the shift include PAtient will maintain an Sp02 greater then 92% on 3L NC or less through this shift    Patient was able to be titrated to 2L NC overnight. Sats remains in the mid 90's. PRN cough meds are helping.

## 2025-02-06 NOTE — NURSING NOTE
Patient had been titrated to 2L NC. She continues with a harsh cough. She also had a bloody nose last evening. Dr. Ramos placed orders for cough medicine and saline nasal spray, they have been helpful. PRN SA TX was admin overnight for wheezing and also effective.

## 2025-02-06 NOTE — PROGRESS NOTES
Padmini Lang is a 74 y.o. female on day 2 of admission presenting with Multilobar lung infiltrate.      Subjective   Patient assessed at bedside; lying in bed. She is feeling better than she did when she came in. She had a bad night of coughing, congestion, and had a bloody nose. She feels better this am. She denies pain, fever, chills.       Objective     Last Recorded Vitals  /78 (BP Location: Right arm, Patient Position: Lying)   Pulse 76   Temp 36.6 °C (97.9 °F) (Temporal)   Resp 18   Wt 110 kg (242 lb 8.1 oz)   SpO2 90%   Intake/Output last 3 Shifts:    Intake/Output Summary (Last 24 hours) at 2/6/2025 1323  Last data filed at 2/6/2025 1100  Gross per 24 hour   Intake 1320 ml   Output 2700 ml   Net -1380 ml       Admission Weight  Weight: 111 kg (244 lb) (02/03/25 1310)    Daily Weight  02/06/25 : 110 kg (242 lb 8.1 oz)    Image Results  ECG 12 lead  Normal sinus rhythm  Normal ECG  When compared with ECG of 17-AUG-2022 11:44,  Previous ECG has undetermined rhythm, needs review      Physical Exam  Vitals reviewed.   Constitutional:       Appearance: Normal appearance. She is obese.   HENT:      Head: Normocephalic and atraumatic.      Right Ear: External ear normal.      Left Ear: External ear normal.      Nose: Nose normal.      Mouth/Throat:      Mouth: Mucous membranes are moist.      Pharynx: Oropharynx is clear.   Eyes:      Conjunctiva/sclera: Conjunctivae normal.      Pupils: Pupils are equal, round, and reactive to light.   Cardiovascular:      Rate and Rhythm: Normal rate and regular rhythm.      Pulses: Normal pulses.      Heart sounds: Normal heart sounds.   Pulmonary:      Effort: Pulmonary effort is normal.      Breath sounds: Rhonchi and rales present.   Abdominal:      General: Bowel sounds are normal.      Palpations: Abdomen is soft.   Musculoskeletal:         General: Normal range of motion.      Cervical back: Normal range of motion and neck supple.   Skin:     General: Skin is  warm and dry.   Neurological:      General: No focal deficit present.      Mental Status: She is alert and oriented to person, place, and time.   Psychiatric:         Mood and Affect: Mood normal.         Behavior: Behavior normal.         Relevant Results    Scheduled medications  azithromycin, 500 mg, oral, q24h LUIS  cefuroxime, 500 mg, oral, BID  clopidogrel, 75 mg, oral, Daily  diphenhydrAMINE, 25 mg, oral, BID  enoxaparin, 40 mg, subcutaneous, q24h  furosemide, 80 mg, oral, Daily  levothyroxine, 150 mcg, oral, Daily  methylPREDNISolone sodium succinate (PF), 40 mg, intravenous, q12h  montelukast, 10 mg, oral, Nightly  oxygen, , inhalation, Continuous - Inhalation  pantoprazole, 40 mg, oral, Daily before breakfast  potassium chloride CR, 10 mEq, oral, Daily      Continuous medications     PRN medications  PRN medications: benzocaine-menthol, benzonatate, diphenhydrAMINE, guaiFENesin, ipratropium-albuteroL, [DISCONTINUED] ondansetron ODT **OR** ondansetron, polyethylene glycol, sodium chloride    Results for orders placed or performed during the hospital encounter of 02/03/25 (from the past 24 hours)   CBC   Result Value Ref Range    WBC 16.6 (H) 4.4 - 11.3 x10*3/uL    nRBC 0.0 0.0 - 0.0 /100 WBCs    RBC 4.75 4.00 - 5.20 x10*6/uL    Hemoglobin 14.7 12.0 - 16.0 g/dL    Hematocrit 47.5 (H) 36.0 - 46.0 %     80 - 100 fL    MCH 30.9 26.0 - 34.0 pg    MCHC 30.9 (L) 32.0 - 36.0 g/dL    RDW 12.6 11.5 - 14.5 %    Platelets 410 150 - 450 x10*3/uL   Basic metabolic panel   Result Value Ref Range    Glucose 131 (H) 74 - 99 mg/dL    Sodium 139 136 - 145 mmol/L    Potassium 4.2 3.5 - 5.3 mmol/L    Chloride 92 (L) 98 - 107 mmol/L    Bicarbonate 41 (HH) 21 - 32 mmol/L    Anion Gap 10 10 - 20 mmol/L    Urea Nitrogen 12 6 - 23 mg/dL    Creatinine 0.55 0.50 - 1.05 mg/dL    eGFR >90 >60 mL/min/1.73m*2    Calcium 9.2 8.6 - 10.3 mg/dL                   Assessment/Plan   This patient currently has cardiac telemetry ordered; if  you would like to modify or discontinue the telemetry order, click here to go to the orders activity to modify/discontinue the order.    Assessment & Plan  Multilobar lung infiltrate    Community acquired bacterial pneumonia    Acute respiratory failure with hypoxia (Multi)    Mild intermittent asthma with exacerbation (HHS-HCC)    Mast cell disease    Hypothyroidism (acquired)    Acute on chronic diastolic heart failure    Cerebellar cerebrovascular accident (CVA) without late effect    Diuretic-induced hypokalemia    Community acquired bacterial pneumonia  Acute respiratory failure with hypoxia  Mild Asthma with exacerbation  - supplemental oxygen to keep SpO2 > 90%  - currently on 2lpm via NC  - No oxygen at home  - continue cefuroxime 500 mg BID, azithromycin 500 mg daily  - conitnue duoneb q6h prn  - RT for bronchial hygiene  - blood cultures negative to date  - CT chest:  Multifocal airspace disease worse at the right lung base along with small effusions. Underlying multifocal pneumonia is suspected.   - continue montelukast 10 mg hs    Acute on Chronic Diastolic heart failure  Hx cerebellar CVA  - echocardiogram pending  - last echo 1/3/18: normal LVSF with an EF of 55-60% with pseudonormal pattern of LVDF  - continue furosemide 40 mg daily, clopidogrel 75 mg dialy  - monitor BP  - I&O  - Daily weight    Hypokalemia d/t diuretics  - continue KCL 10 mEq daily    Mast Cell Disease  - continue diphenhydramine 25 mg BID    Hypothyroidism  - continue levothyroxine 150 mcg daily    PUD ppx  - continue pantoprazole 40 mg daily    DVT ppx  - SCD    Code status: Full    Disposition: Patient requires more than 2 inpatient days.              Olivia Grimes, APRN-CNP

## 2025-02-06 NOTE — CARE PLAN
The patient's goals for the shift include      The clinical goals for the shift include Patient will maintain SpO2 > 90% with supplemental O2 through 1900    Over the shift, the patient did meet goal. Patient took meds per order without difficulty, plan is to discharge home tomorrow. Patient had increased urine output and remained independent in room. Will continue with POC, call light in reach.

## 2025-02-07 ENCOUNTER — APPOINTMENT (OUTPATIENT)
Dept: CARDIOLOGY | Facility: HOSPITAL | Age: 75
End: 2025-02-07
Payer: MEDICARE

## 2025-02-07 LAB
ANION GAP SERPL CALC-SCNC: 7 MMOL/L (ref 10–20)
AORTIC VALVE PEAK VELOCITY: 1.53 M/S
AV PEAK GRADIENT: 9 MMHG
AVA (PEAK VEL): 2.37 CM2
BUN SERPL-MCNC: 14 MG/DL (ref 6–23)
CALCIUM SERPL-MCNC: 8.7 MG/DL (ref 8.6–10.3)
CHLORIDE SERPL-SCNC: 95 MMOL/L (ref 98–107)
CO2 SERPL-SCNC: 43 MMOL/L (ref 21–32)
CREAT SERPL-MCNC: 0.49 MG/DL (ref 0.5–1.05)
EGFRCR SERPLBLD CKD-EPI 2021: >90 ML/MIN/1.73M*2
EJECTION FRACTION APICAL 4 CHAMBER: 65.4
EJECTION FRACTION: 63 %
ERYTHROCYTE [DISTWIDTH] IN BLOOD BY AUTOMATED COUNT: 12.4 % (ref 11.5–14.5)
GLUCOSE SERPL-MCNC: 104 MG/DL (ref 74–99)
HCT VFR BLD AUTO: 47 % (ref 36–46)
HGB BLD-MCNC: 14.7 G/DL (ref 12–16)
LEFT ATRIUM VOLUME AREA LENGTH INDEX BSA: 44.3 ML/M2
LEFT VENTRICLE INTERNAL DIMENSION DIASTOLE: 4.72 CM (ref 3.5–6)
LEFT VENTRICULAR OUTFLOW TRACT DIAMETER: 2.05 CM
MCH RBC QN AUTO: 30.8 PG (ref 26–34)
MCHC RBC AUTO-ENTMCNC: 31.3 G/DL (ref 32–36)
MCV RBC AUTO: 98 FL (ref 80–100)
MITRAL VALVE E/A RATIO: 0.74
NRBC BLD-RTO: 0 /100 WBCS (ref 0–0)
PLATELET # BLD AUTO: 383 X10*3/UL (ref 150–450)
POTASSIUM SERPL-SCNC: 4 MMOL/L (ref 3.5–5.3)
RBC # BLD AUTO: 4.78 X10*6/UL (ref 4–5.2)
RIGHT VENTRICLE FREE WALL PEAK S': 17 CM/S
RIGHT VENTRICLE PEAK SYSTOLIC PRESSURE: 25.2 MMHG
SODIUM SERPL-SCNC: 141 MMOL/L (ref 136–145)
TRICUSPID ANNULAR PLANE SYSTOLIC EXCURSION: 3.6 CM
WBC # BLD AUTO: 15.1 X10*3/UL (ref 4.4–11.3)

## 2025-02-07 PROCEDURE — 94760 N-INVAS EAR/PLS OXIMETRY 1: CPT | Mod: IPSPLIT

## 2025-02-07 PROCEDURE — 80048 BASIC METABOLIC PNL TOTAL CA: CPT | Mod: IPSPLIT | Performed by: NURSE PRACTITIONER

## 2025-02-07 PROCEDURE — 1100000001 HC PRIVATE ROOM DAILY: Mod: IPSPLIT

## 2025-02-07 PROCEDURE — 36415 COLL VENOUS BLD VENIPUNCTURE: CPT | Mod: IPSPLIT | Performed by: NURSE PRACTITIONER

## 2025-02-07 PROCEDURE — 2500000005 HC RX 250 GENERAL PHARMACY W/O HCPCS: Mod: IPSPLIT | Performed by: INTERNAL MEDICINE

## 2025-02-07 PROCEDURE — 93306 TTE W/DOPPLER COMPLETE: CPT | Mod: IPSPLIT

## 2025-02-07 PROCEDURE — 99232 SBSQ HOSP IP/OBS MODERATE 35: CPT | Performed by: NURSE PRACTITIONER

## 2025-02-07 PROCEDURE — 2500000001 HC RX 250 WO HCPCS SELF ADMINISTERED DRUGS (ALT 637 FOR MEDICARE OP): Mod: IPSPLIT | Performed by: NURSE PRACTITIONER

## 2025-02-07 PROCEDURE — 9420000001 HC RT PATIENT EDUCATION 5 MIN: Mod: IPSPLIT

## 2025-02-07 PROCEDURE — 2500000004 HC RX 250 GENERAL PHARMACY W/ HCPCS (ALT 636 FOR OP/ED): Mod: IPSPLIT | Performed by: NURSE PRACTITIONER

## 2025-02-07 PROCEDURE — 85027 COMPLETE CBC AUTOMATED: CPT | Mod: IPSPLIT | Performed by: NURSE PRACTITIONER

## 2025-02-07 PROCEDURE — 93306 TTE W/DOPPLER COMPLETE: CPT | Performed by: INTERNAL MEDICINE

## 2025-02-07 PROCEDURE — 2500000002 HC RX 250 W HCPCS SELF ADMINISTERED DRUGS (ALT 637 FOR MEDICARE OP, ALT 636 FOR OP/ED): Mod: IPSPLIT | Performed by: NURSE PRACTITIONER

## 2025-02-07 PROCEDURE — 2500000004 HC RX 250 GENERAL PHARMACY W/ HCPCS (ALT 636 FOR OP/ED): Mod: JZ,TB,IPSPLIT | Performed by: NURSE PRACTITIONER

## 2025-02-07 RX ADMIN — CEFUROXIME AXETIL 500 MG: 250 TABLET, FILM COATED ORAL at 23:13

## 2025-02-07 RX ADMIN — PANTOPRAZOLE SODIUM 40 MG: 40 TABLET, DELAYED RELEASE ORAL at 05:37

## 2025-02-07 RX ADMIN — DIPHENHYDRAMINE HYDROCHLORIDE 25 MG: 25 CAPSULE ORAL at 16:46

## 2025-02-07 RX ADMIN — DIPHENHYDRAMINE HYDROCHLORIDE 25 MG: 25 CAPSULE ORAL at 22:35

## 2025-02-07 RX ADMIN — FUROSEMIDE 80 MG: 40 TABLET ORAL at 10:03

## 2025-02-07 RX ADMIN — Medication 2 L/MIN: at 20:29

## 2025-02-07 RX ADMIN — LEVOTHYROXINE SODIUM 150 MCG: 75 TABLET ORAL at 05:37

## 2025-02-07 RX ADMIN — ENOXAPARIN SODIUM 40 MG: 40 INJECTION SUBCUTANEOUS at 20:23

## 2025-02-07 RX ADMIN — DIPHENHYDRAMINE HYDROCHLORIDE 25 MG: 25 CAPSULE ORAL at 08:43

## 2025-02-07 RX ADMIN — DIPHENHYDRAMINE HYDROCHLORIDE 25 MG: 25 CAPSULE ORAL at 04:05

## 2025-02-07 RX ADMIN — METHYLPREDNISOLONE SODIUM SUCCINATE 40 MG: 40 INJECTION, POWDER, FOR SOLUTION INTRAMUSCULAR; INTRAVENOUS at 17:38

## 2025-02-07 RX ADMIN — Medication 2 L/MIN: at 08:23

## 2025-02-07 RX ADMIN — CLOPIDOGREL BISULFATE 75 MG: 75 TABLET ORAL at 10:03

## 2025-02-07 RX ADMIN — POTASSIUM CHLORIDE 10 MEQ: 750 TABLET, FILM COATED, EXTENDED RELEASE ORAL at 10:03

## 2025-02-07 RX ADMIN — CEFUROXIME AXETIL 500 MG: 250 TABLET, FILM COATED ORAL at 10:03

## 2025-02-07 RX ADMIN — MONTELUKAST 10 MG: 10 TABLET, FILM COATED ORAL at 20:24

## 2025-02-07 RX ADMIN — AZITHROMYCIN DIHYDRATE 500 MG: 250 TABLET ORAL at 10:03

## 2025-02-07 RX ADMIN — METHYLPREDNISOLONE SODIUM SUCCINATE 40 MG: 40 INJECTION, POWDER, FOR SOLUTION INTRAMUSCULAR; INTRAVENOUS at 05:36

## 2025-02-07 ASSESSMENT — PAIN SCALES - GENERAL
PAINLEVEL_OUTOF10: 0 - NO PAIN
PAINLEVEL_OUTOF10: 0 - NO PAIN

## 2025-02-07 NOTE — CARE PLAN
The patient's goals for the shift include      The clinical goals for the shift include Patient will maintain SpO2 92% higher throughout shift    Patient with uneventful shift. Remains independent in room. Tolerating ordered medications per MAR. Remains on 2L oxygen. RT qualified patient for home oxygen today.  Echo was done, results pending. Patient sitting on edge of bed, call button in reach.

## 2025-02-07 NOTE — PROCEDURES
Pt's resting pox on RA 91%.Ambulation pox on room air 88% . Placed on 2l with ambulation. Pox 95%Padmini Lang qualifies for 2l with exertion.

## 2025-02-07 NOTE — PROGRESS NOTES
Padmini Lang is a 74 y.o. female on day 3 of admission presenting with Multilobar lung infiltrate.      Subjective   Patient assessed at bedside; sitting at the side of the bed. She is feeling better. She complained that her legs hurt. She remains on 3lpm of oxygen, no oxygen at home. She denies SOB, fever, chills.       Objective     Last Recorded Vitals  /65 (BP Location: Right arm, Patient Position: Lying)   Pulse 73   Temp 36.7 °C (98.1 °F) (Temporal)   Resp 20   Wt 109 kg (240 lb 1.3 oz)   SpO2 94%   Intake/Output last 3 Shifts:    Intake/Output Summary (Last 24 hours) at 2/7/2025 1233  Last data filed at 2/7/2025 1013  Gross per 24 hour   Intake 480 ml   Output 1950 ml   Net -1470 ml       Admission Weight  Weight: 111 kg (244 lb) (02/03/25 1310)    Daily Weight  02/07/25 : 109 kg (240 lb 1.3 oz)    Image Results  ECG 12 lead  Normal sinus rhythm  Normal ECG  When compared with ECG of 17-AUG-2022 11:44,  Previous ECG has undetermined rhythm, needs review      Physical Exam  Vitals reviewed.   Constitutional:       Appearance: Normal appearance. She is obese.   HENT:      Head: Normocephalic and atraumatic.      Right Ear: External ear normal.      Left Ear: External ear normal.      Nose: Nose normal.      Mouth/Throat:      Mouth: Mucous membranes are moist.      Pharynx: Oropharynx is clear.   Eyes:      Conjunctiva/sclera: Conjunctivae normal.      Pupils: Pupils are equal, round, and reactive to light.   Cardiovascular:      Rate and Rhythm: Normal rate and regular rhythm.      Pulses: Normal pulses.      Heart sounds: Normal heart sounds.   Pulmonary:      Effort: Pulmonary effort is normal.      Breath sounds: Rhonchi and rales present.   Abdominal:      General: Bowel sounds are normal.      Palpations: Abdomen is soft.   Musculoskeletal:         General: Normal range of motion.      Cervical back: Normal range of motion and neck supple.   Skin:     General: Skin is warm and dry.    Neurological:      General: No focal deficit present.      Mental Status: She is alert and oriented to person, place, and time.   Psychiatric:         Mood and Affect: Mood normal.         Behavior: Behavior normal.       Relevant Results               Assessment/Plan   This patient currently has cardiac telemetry ordered; if you would like to modify or discontinue the telemetry order, click here to go to the orders activity to modify/discontinue the order.      Assessment & Plan  Multilobar lung infiltrate    Community acquired bacterial pneumonia    Acute respiratory failure with hypoxia (Multi)    Mild intermittent asthma with exacerbation (HHS-HCC)    Mast cell disease    Hypothyroidism (acquired)    Acute on chronic diastolic heart failure    Cerebellar cerebrovascular accident (CVA) without late effect    Diuretic-induced hypokalemia    Community acquired bacterial pneumonia  Acute respiratory failure with hypoxia  Mild Asthma with exacerbation  - supplemental oxygen to keep SpO2 > 90%  - currently on 3lpm via NC  - No oxygen at home  - continue cefuroxime 500 mg BID  - completed azithromycin 500 mg daily  - conitnue duoneb q6h prn  - RT for bronchial hygiene  - blood cultures negative to date  - CT chest:  Multifocal airspace disease worse at the right lung base along with small effusions. Underlying multifocal pneumonia is suspected.   - continue montelukast 10 mg hs     Acute on Chronic Diastolic heart failure  Hx cerebellar CVA  - echocardiogram pending  - last echo 1/3/18: normal LVSF with an EF of 55-60% with pseudonormal pattern of LVDF  - continue furosemide 40 mg daily, clopidogrel 75 mg dialy  - monitor BP  - I&O  - Daily weight     Hypokalemia d/t diuretics  - continue KCL 10 mEq daily     Mast Cell Disease  - continue diphenhydramine 25 mg BID     Hypothyroidism  - continue levothyroxine 150 mcg daily     PUD ppx  - continue pantoprazole 40 mg daily     DVT ppx  - SCD     Code status: Full      Disposition: Patient requires more than 2 inpatient days.              Olivia Grimes, APRN-CNP

## 2025-02-07 NOTE — CARE PLAN
The clinical goals for the shift include Pt's SpO2 will maintain  90% with supplemental O2 throughout the shift.    Over the shift, the patient did make progress toward the following goals. Pt resting quietly with uneventful night. Remained independent in the room. Possible to discharge home in the morning. VSS. O2 down to 2L/NC. With SpO2 >95%. Continue with POC and call light within reach.

## 2025-02-08 VITALS
SYSTOLIC BLOOD PRESSURE: 153 MMHG | HEART RATE: 86 BPM | BODY MASS INDEX: 36.39 KG/M2 | DIASTOLIC BLOOD PRESSURE: 70 MMHG | TEMPERATURE: 97.6 F | WEIGHT: 240.08 LBS | HEIGHT: 68 IN | RESPIRATION RATE: 16 BRPM | OXYGEN SATURATION: 91 %

## 2025-02-08 LAB
ANION GAP SERPL CALC-SCNC: 7 MMOL/L (ref 10–20)
BACTERIA BLD CULT: NORMAL
BACTERIA BLD CULT: NORMAL
BUN SERPL-MCNC: 16 MG/DL (ref 6–23)
CALCIUM SERPL-MCNC: 9.2 MG/DL (ref 8.6–10.3)
CHLORIDE SERPL-SCNC: 93 MMOL/L (ref 98–107)
CO2 SERPL-SCNC: 45 MMOL/L (ref 21–32)
CREAT SERPL-MCNC: 0.6 MG/DL (ref 0.5–1.05)
EGFRCR SERPLBLD CKD-EPI 2021: >90 ML/MIN/1.73M*2
ERYTHROCYTE [DISTWIDTH] IN BLOOD BY AUTOMATED COUNT: 12.5 % (ref 11.5–14.5)
GLUCOSE SERPL-MCNC: 85 MG/DL (ref 74–99)
HCT VFR BLD AUTO: 50.6 % (ref 36–46)
HGB BLD-MCNC: 15.5 G/DL (ref 12–16)
MCH RBC QN AUTO: 30.4 PG (ref 26–34)
MCHC RBC AUTO-ENTMCNC: 30.6 G/DL (ref 32–36)
MCV RBC AUTO: 99 FL (ref 80–100)
NRBC BLD-RTO: 0 /100 WBCS (ref 0–0)
PLATELET # BLD AUTO: 435 X10*3/UL (ref 150–450)
POTASSIUM SERPL-SCNC: 4.3 MMOL/L (ref 3.5–5.3)
RBC # BLD AUTO: 5.1 X10*6/UL (ref 4–5.2)
SODIUM SERPL-SCNC: 141 MMOL/L (ref 136–145)
WBC # BLD AUTO: 17.1 X10*3/UL (ref 4.4–11.3)

## 2025-02-08 PROCEDURE — 94760 N-INVAS EAR/PLS OXIMETRY 1: CPT | Mod: IPSPLIT

## 2025-02-08 PROCEDURE — 99239 HOSP IP/OBS DSCHRG MGMT >30: CPT | Performed by: NURSE PRACTITIONER

## 2025-02-08 PROCEDURE — 2500000004 HC RX 250 GENERAL PHARMACY W/ HCPCS (ALT 636 FOR OP/ED): Mod: JZ,TB,IPSPLIT | Performed by: NURSE PRACTITIONER

## 2025-02-08 PROCEDURE — 2500000002 HC RX 250 W HCPCS SELF ADMINISTERED DRUGS (ALT 637 FOR MEDICARE OP, ALT 636 FOR OP/ED): Mod: IPSPLIT | Performed by: NURSE PRACTITIONER

## 2025-02-08 PROCEDURE — 2500000001 HC RX 250 WO HCPCS SELF ADMINISTERED DRUGS (ALT 637 FOR MEDICARE OP): Mod: IPSPLIT | Performed by: NURSE PRACTITIONER

## 2025-02-08 PROCEDURE — 36415 COLL VENOUS BLD VENIPUNCTURE: CPT | Mod: IPSPLIT | Performed by: NURSE PRACTITIONER

## 2025-02-08 PROCEDURE — 85027 COMPLETE CBC AUTOMATED: CPT | Mod: IPSPLIT | Performed by: NURSE PRACTITIONER

## 2025-02-08 PROCEDURE — 80048 BASIC METABOLIC PNL TOTAL CA: CPT | Mod: IPSPLIT | Performed by: NURSE PRACTITIONER

## 2025-02-08 RX ORDER — METHYLPREDNISOLONE 4 MG/1
TABLET ORAL
Qty: 21 TABLET | Refills: 0 | Status: SHIPPED | OUTPATIENT
Start: 2025-02-08 | End: 2025-02-14

## 2025-02-08 RX ORDER — CEFUROXIME AXETIL 500 MG/1
500 TABLET ORAL 2 TIMES DAILY
Qty: 5 TABLET | Refills: 0 | Status: SHIPPED | OUTPATIENT
Start: 2025-02-08 | End: 2025-02-11

## 2025-02-08 RX ADMIN — CEFUROXIME AXETIL 500 MG: 250 TABLET, FILM COATED ORAL at 08:14

## 2025-02-08 RX ADMIN — PANTOPRAZOLE SODIUM 40 MG: 40 TABLET, DELAYED RELEASE ORAL at 06:22

## 2025-02-08 RX ADMIN — POTASSIUM CHLORIDE 10 MEQ: 750 TABLET, FILM COATED, EXTENDED RELEASE ORAL at 08:15

## 2025-02-08 RX ADMIN — DIPHENHYDRAMINE HYDROCHLORIDE 25 MG: 25 CAPSULE ORAL at 08:14

## 2025-02-08 RX ADMIN — CLOPIDOGREL BISULFATE 75 MG: 75 TABLET ORAL at 08:14

## 2025-02-08 RX ADMIN — LEVOTHYROXINE SODIUM 150 MCG: 75 TABLET ORAL at 06:22

## 2025-02-08 RX ADMIN — FUROSEMIDE 80 MG: 40 TABLET ORAL at 08:14

## 2025-02-08 RX ADMIN — METHYLPREDNISOLONE SODIUM SUCCINATE 40 MG: 40 INJECTION, POWDER, FOR SOLUTION INTRAMUSCULAR; INTRAVENOUS at 06:22

## 2025-02-08 RX ADMIN — DIPHENHYDRAMINE HYDROCHLORIDE 25 MG: 25 CAPSULE ORAL at 05:14

## 2025-02-08 ASSESSMENT — PAIN SCALES - GENERAL
PAINLEVEL_OUTOF10: 0 - NO PAIN
PAINLEVEL_OUTOF10: 0 - NO PAIN
PAINLEVEL_OUTOF10: 2

## 2025-02-08 NOTE — CARE PLAN
The patient's goals for the shift include      The clinical goals for the shift include patient will maintain spo2 of 92% or higher this shift    Patient remained above 92% throughout shift. Rested majority of shift. Denies pain. Call light within reach. Will continue with poc.

## 2025-02-08 NOTE — CARE PLAN
Pt's  here to transport home, patient and  verbalize understanding of discharge instructions and prescriptions, RT to contact [facility] and bring pt an oxygen tank for the ride home

## 2025-02-08 NOTE — DISCHARGE SUMMARY
Discharge Diagnosis  Community acquired bacterial pneumonia  Acute respiratory failure with hypoxia  COPD exacerbation  Mild Asthma with exacerbation  Acute on choric Diastolic Heart Failure      Issues Requiring Follow-Up      Discharge Meds     Medication List      START taking these medications     cefuroxime 500 mg tablet; Commonly known as: Ceftin; Take 1 tablet (500   mg) by mouth 2 times a day for 5 doses.   methylPREDNISolone 4 mg tablets; Commonly known as: Medrol Dospak; Take   as directed on package.     CHANGE how you take these medications     furosemide 40 mg tablet; Commonly known as: Lasix; What changed: Another   medication with the same name was removed. Continue taking this   medication, and follow the directions you see here.     CONTINUE taking these medications     albuterol 90 mcg/actuation inhaler   clopidogrel 75 mg tablet; Commonly known as: Plavix   EPINEPHrine 0.3 mg/0.3 mL injection syringe; Commonly known as: Epipen   montelukast 10 mg tablet; Commonly known as: Singulair   potassium chloride CR 10 mEq ER tablet; Commonly known as: Klor-Con   * Repatha Pushtronex 420 mg/3.5 mL injection; Generic drug: evolocumab   with infusor   * Repatha SureClick 140 mg/mL injection; Generic drug: evolocumab   Trelegy Ellipta 200-62.5-25 mcg blister with device; Generic drug:   fluticasone-umeclidin-vilanter   VITAMIN B COMPLEX ORAL  * This list has 2 medication(s) that are the same as other medications   prescribed for you. Read the directions carefully, and ask your doctor or   other care provider to review them with you.       Test Results Pending At Discharge  Pending Labs       No current pending labs.         74 y.o. female  with PMHx significant for Mast cell disease, anemia, OA, asthma, hypothyroidism, cerebellar CVA, hepatic steatosis  who presented to [facility] due to shortness of breath with a productive cough, generalized weakness, and wheezing that started over the past several weeks.  She was found in the ER hypoxic on room air requiring oxygen of 5 liters to maintain oxygen saturation > 90%. She is initiated on rocephin, azithromycin, solumedrol, and duo-neb in the ED and transferred to the [facility] in stable condition. She did not have reaction to the antibiotics throughout the night and thus continued treatment today.     Hospital Course    Community acquired bacterial pneumonia  Acute respiratory failure with hypoxia  Mild Asthma with exacerbation  - supplemental oxygen to keep SpO2 > 90%  - currently on 2lpm via NC with activity  - No oxygen at home  - continue cefuroxime 500 mg BID  - completed azithromycin 500 mg daily  - conitnue duoneb q6h prn  - RT for bronchial hygiene  - blood cultures negative to date  - CT chest:  Multifocal airspace disease worse at the right lung base along with small effusions. Underlying multifocal pneumonia is suspected.   - continue montelukast 10 mg hs     Acute on Chronic Diastolic heart failure  Hx cerebellar CVA  - echocardiogram pending  - last echo 1/3/18: normal LVSF with an EF of 55-60% with pseudonormal pattern of LVDF  - continue furosemide 40 mg daily, clopidogrel 75 mg dialy  - monitor BP  - I&O  - Daily weight     Hypokalemia d/t diuretics  - continue KCL 10 mEq daily     Mast Cell Disease  - continue diphenhydramine 25 mg BID     Hypothyroidism  - continue levothyroxine 150 mcg daily     PUD ppx  - continue pantoprazole 40 mg daily     DVT ppx  - SCD     Code status: Full     Disposition: Patient was stable to be discharged to home with oxygen 2lpm via NC with activity. Shew as discharged on cefuroxime and medrol dose pack. She will follow up with her PCP.    Total cumulative time spent in preparation of this discharge including documentation review, coordination of care with the medical team including PT/SW/care coordinators and treating consultants, discussion with patient and pertinent family members and finalization of prescriptions, follow-up  appointments, and this discharge summary was approximately 45 minutes.     Seen and discussed with Dr. Hao Mcdonald MD    Pertinent Physical Exam At Time of Discharge  Physical Exam  Vitals reviewed.   Constitutional:       Appearance: Normal appearance. She is obese.   HENT:      Head: Normocephalic and atraumatic.      Right Ear: External ear normal.      Left Ear: External ear normal.      Nose: Nose normal.      Mouth/Throat:      Mouth: Mucous membranes are moist.      Pharynx: Oropharynx is clear.   Eyes:      Conjunctiva/sclera: Conjunctivae normal.      Pupils: Pupils are equal, round, and reactive to light.   Cardiovascular:      Rate and Rhythm: Normal rate and regular rhythm.      Pulses: Normal pulses.      Heart sounds: Normal heart sounds.   Pulmonary:      Effort: Pulmonary effort is normal.      Breath sounds: Rhonchi and rales present.   Abdominal:      General: Bowel sounds are normal.      Palpations: Abdomen is soft.   Musculoskeletal:         General: Normal range of motion.      Cervical back: Normal range of motion and neck supple.   Skin:     General: Skin is warm and dry.   Neurological:      General: No focal deficit present.      Mental Status: She is alert and oriented to person, place, and time.   Psychiatric:         Mood and Affect: Mood normal.         Behavior: Behavior normal.   Outpatient Follow-Up  No future appointments.      Olivia Grimes, APRN-CNP

## 2025-02-26 LAB
ATRIAL RATE: 75 BPM
P AXIS: 20 DEGREES
P OFFSET: 202 MS
P ONSET: 137 MS
PR INTERVAL: 162 MS
Q ONSET: 218 MS
QRS COUNT: 13 BEATS
QRS DURATION: 86 MS
QT INTERVAL: 392 MS
QTC CALCULATION(BAZETT): 437 MS
QTC FREDERICIA: 422 MS
R AXIS: 1 DEGREES
T AXIS: 36 DEGREES
T OFFSET: 414 MS
VENTRICULAR RATE: 75 BPM

## 2025-04-24 ENCOUNTER — OFFICE VISIT (OUTPATIENT)
Dept: ORTHOPEDIC SURGERY | Facility: CLINIC | Age: 75
End: 2025-04-24
Payer: MEDICARE

## 2025-04-24 VITALS — HEIGHT: 68 IN | WEIGHT: 238 LBS | BODY MASS INDEX: 36.07 KG/M2

## 2025-04-24 DIAGNOSIS — M47.816 LUMBAR SPONDYLOSIS: ICD-10-CM

## 2025-04-24 DIAGNOSIS — T07.XXXA MULTIPLE CONTUSIONS: ICD-10-CM

## 2025-04-24 DIAGNOSIS — Z96.641 S/P TOTAL RIGHT HIP ARTHROPLASTY: ICD-10-CM
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
## 2025-04-24 PROCEDURE — 99214 OFFICE O/P EST MOD 30 MIN: CPT | Performed by: ORTHOPAEDIC SURGERY

## 2025-04-24 RX ORDER — PREDNISONE 10 MG/1
10 TABLET ORAL SEE ADMIN INSTRUCTIONS
Qty: 42 TABLET | Refills: 0 | Status: SHIPPED | OUTPATIENT
Start: 2025-04-24 | End: 2025-05-15

## 2025-04-24 ASSESSMENT — PAIN SCALES - GENERAL: PAINLEVEL_OUTOF10: 7

## 2025-04-24 ASSESSMENT — PAIN DESCRIPTION - DESCRIPTORS: DESCRIPTORS: BURNING;SHARP;SHOOTING

## 2025-04-24 ASSESSMENT — PAIN - FUNCTIONAL ASSESSMENT: PAIN_FUNCTIONAL_ASSESSMENT: 0-10

## 2025-04-24 NOTE — PROGRESS NOTES
SUBJECTIVE  CHIEF COMPLAINT:   Chief Complaint   Patient presents with    Right Hip - Pain        HPI: Padmini Lang is a pleasant 75 y.o. year-old female known to our office status post right total hip arthroplasty in 2018.  She did well following surgery.  She unfortunately suffered a stroke in 2019 and has residual deficits and required the use of a rollator for ambulation.  She has also had back surgery.  She was recently in hospital with pneumonia last month.  On March 5, 2025, she was at home when she fell directly into the corner of a door to the posterior lateral hip and felt excruciating pain.  She was toughing it out but saw her regular doctor who obtained an x-ray at the [facility].  She will note some notes that she fell 2 separate times in the past several months but not with as bad pain.  She also describes limited use down her leg into her lateral ankle and toe.  Her pain she comes today for further evaluation.    REVIEW OF SYSTEMS  The patient denies any fever, chills, chest pain, shortness of breath or difficulty breathing.  Patient denies any numbness, tingling, or radicular symptoms.  Adult patient history sheet was filled out by the patient today in clinic and will be scanned into the EMR.  I personally reviewed this form which will be scanned into the EMR.  This includes Past Medical History, Past Surgical History, Medications, Allergies, Social History, Family History and 12 point review of systems.    Medical History[1]     Allergies[2]     Surgical History[3]     Family History[4]     Social History     Socioeconomic History    Marital status:      Spouse name: Not on file    Number of children: Not on file    Years of education: Not on file    Highest education level: Not on file   Occupational History    Not on file   Tobacco Use    Smoking status: Former     Types: Cigarettes    Smokeless tobacco: Never   Vaping Use    Vaping status: Never Used   Substance and Sexual  Activity    Alcohol use: Not Currently    Drug use: Never    Sexual activity: Defer   Other Topics Concern    Not on file   Social History Narrative    Not on file     Social Drivers of Health     Financial Resource Strain: Low Risk  (2/15/2025)    Received from [facility]    Overall Financial Resource Strain (CARDIA)     Difficulty of Paying Living Expenses: Not very hard   Food Insecurity: No Food Insecurity (2/15/2025)    Received from [facility]    Hunger Vital Sign     Worried About Running Out of Food in the Last Year: Never true     Ran Out of Food in the Last Year: Never true   Transportation Needs: No Transportation Needs (2/15/2025)    Received from [facility]    PRAPARE - Transportation     Lack of Transportation (Medical): No     Lack of Transportation (Non-Medical): No   Physical Activity: Insufficiently Active (2/15/2025)    Received from [facility]    Exercise Vital Sign     Days of Exercise per Week: 1 day     Minutes of Exercise per Session: 10 min   Stress: No Stress Concern Present (2/15/2025)    Received from [facility]    [employer] [place] [employer] - Occupational Stress Questionnaire     Feeling of Stress : Only a little   Social Connections: Moderately Isolated (2/15/2025)    Received from [facility]    Social Connection and Isolation Panel [NHANES]     Frequency of Communication with Friends and Family: More than three times a week     Frequency of Social Gatherings with Friends and Family: Once a week     Attends Denominational Services: Never     Active Member of Clubs or Organizations: No     Attends Club or Organization Meetings: Never     Marital Status:    Intimate Partner Violence: Patient Unable To Answer (2/3/2025)    Humiliation, Afraid, Rape, and Kick questionnaire     Fear of Current or Ex-Partner: Patient unable to answer     Emotionally Abused: Patient unable to answer     Physically Abused: Patient unable to answer     Sexually  Abused: Patient unable to answer   Housing Stability: Unknown (2/6/2025)    Housing Stability Vital Sign     Unable to Pay for Housing in the Last Year: No     Number of Times Moved in the Last Year: 0     Homeless in the Last Year: Patient unable to answer        CURRENT MEDICATIONS:   Current Medications[5]     OBJECTIVE    PHYSICAL EXAM  Body mass index is 36.19 kg/m².    General: Well-appearing female in no acute distress.  Awake, alert and oriented.  Pleasant and cooperative.  Respiratory: Non-labored breathing  Mood: Euthymic   Gait: Antalgic requiring use of rollator    Posterior surgical incision well-healed.  Pain with deep palpation of the gluteal musculature.  No pain with rotation of the hip.  Diffusely diminished sensation below the knee.  Pain with palpation over the knee.      IMAGING:  X-rays of the right hip and pelvis personally reviewed from [facility] with stable appearing total hip arthroplasty components and compared with prior imaging from 2019.  No evidence of loosening, fracture or dislocation.    ASSESSMENT & PLAN    Padmini Lang is a 75 y.o. female 7 years status post right total hip arthroplasty.  Had done well from a hip standpoint but has had several medical issues that have complicated her over the last several years.  She took a pretty significant fall into a rigid door frame about 6 weeks ago and has had improving but continued pain over her posterior lateral hip.  X-rays today without acute injury or abnormality.  She has several allergies to medications but has been taking NSAID sparingly with some improvement in her pain.  She also describes radicular symptoms.  She has pain over her right knee which also had surgery in the past but declined x-ray in clinic today.  We discussed continued symptomatic treatment and referral to PM&R along with short course of steroids for her back symptoms.  She will follow-up as needed                                [1]   Past Medical  History:  Diagnosis Date    Adhesive capsulitis of right shoulder 06/22/2017    Adhesive capsulitis of both shoulders    Asthma     Mast cell disease     Personal history of diseases of the blood and blood-forming organs and certain disorders involving the immune mechanism     History of anemia    Personal history of other diseases of the musculoskeletal system and connective tissue 11/05/2014    Personal history of arthritis    Personal history of other diseases of the respiratory system 11/05/2014    Personal history of asthma    Personal history of other endocrine, nutritional and metabolic disease 11/05/2014    History of thyroid disease    Personal history of other specified conditions     History of headache    Stroke (Multi)    [2]   Allergies  Allergen Reactions    Aspirin Anaphylaxis, Cardiac arrhythmia/arrest, Hives and Nausea/vomiting     Respiratory arrest    Azithromycin Hives and Itching    Doxycycline Hives    Fish Containing Products Anaphylaxis    Naproxen Anaphylaxis and Rash    Nsaids (Non-Steroidal Anti-Inflammatory Drug) Anaphylaxis    Prochlorperazine Hives, Nausea/vomiting and Rash    Shellfish Containing Products Anaphylaxis and Nausea/vomiting    Sulfamethoxazole-Trimethoprim Cough, Hives, Rash and Shortness of breath    Tree Nuts Anaphylaxis    Acetaminophen Hives and Unknown    Cephalexin Diarrhea, Hives, Nausea/vomiting and Rash     Bloody diarrhea    Clindamycin Nausea And Vomiting    Nicorette Ds Nausea/vomiting    Iodinated Contrast Media Unknown    Adhesive Tape-Silicones Rash    Carbamazepine Rash    Carbinoxamine Hives    Ciprofloxacin Hives, Nausea/vomiting and Unknown    Fentanyl Hives, Nausea/vomiting and Unknown    Gabapentin GI Upset     Dizziness and nausea    Lidocaine Rash and Swelling    Metronidazole Hives    Oxycodone Hives    Vancomycin Hives, Itching and Rash   [3]   Past Surgical History:  Procedure Laterality Date    BACK SURGERY  09/23/2015    Back Surgery     HYSTERECTOMY  05/11/2017    Hysterectomy    OTHER SURGICAL HISTORY  01/31/2019    Cataract surgery    TOTAL HIP ARTHROPLASTY  05/11/2017    Hip Replacement    TOTAL KNEE ARTHROPLASTY  09/23/2015    Total Knee Arthroplasty    TUBAL LIGATION  05/11/2017    Tubal Ligation   [4] No family history on file.  [5]   Current Outpatient Medications   Medication Sig Dispense Refill    albuterol 90 mcg/actuation inhaler Inhale 2 puffs every 4 hours if needed for wheezing or shortness of breath.      clopidogrel (Plavix) 75 mg tablet Take 1 tablet (75 mg) by mouth once daily.      EPINEPHrine 0.3 mg/0.3 mL injection syringe Inject 0.3 mL (0.3 mg) into the muscle 1 time if needed for anaphylaxis.      furosemide (Lasix) 40 mg tablet Take 2 tablets (80 mg) by mouth once daily.      montelukast (Singulair) 10 mg tablet Take 1 tablet (10 mg) by mouth once daily at bedtime.      potassium chloride CR 10 mEq ER tablet Take 1 tablet (10 mEq) by mouth once daily.      Repatha Pushtronex 420 mg/3.5 mL injection Inject 3.5 mL (420 mg) under the skin every 28 (twenty-eight) days.      Repatha SureClick 140 mg/mL injection Inject 1 mL (140 mg) under the skin every 14 (fourteen) days.      Trelegy Ellipta 200-62.5-25 mcg blister with device Inhale 1 puff once daily.      VITAMIN B COMPLEX ORAL Take 0.4 mg by mouth once daily.       No current facility-administered medications for this visit.

## 2025-04-24 NOTE — PROGRESS NOTES
Patient was reviewed and discussed with [facility]A and/or orthopedic resident.  Patient was seen and evaluated in conjunction with VERA and/or orthopedic resident. Findings and treatment plan were discussed and approved by myself, Dr. Shah.    Exam: No pain with logroll.  Tender along medial joint line right knee.  No obvious swelling.  No erythema.    I personally reviewed the following radiographic exams: AP pelvis and right hip from [facility] reviewed and compared to previous hip films shows no change in the alignment or positioning of the total hip.  Previous CT scan shows lower lumbar spine fusion with anterior cage with multilevel degenerative change.    Assessment: Right leg pain, probable lumbar origin.  Stable right total hip replacement.    Plan: Discussed nonoperative and operative options in detail.   Risk and benefits discussed in detail. All questions answered today.  Recovery timeline and expectations discussed in detail.  I do not think she did anything to her implant.  A lot of her pain and numbness down the leg are probably related to her underlying lumbar spine issues.  Will place on a prednisone taper for symptomatic relief.  Discussed obtaining x-rays of the right knee which she defers on today.  Will get her into see the physical medicine doctors for consideration of working up her lumbar spine and whether she needs referral to spine surgery.

## 2025-08-05 ENCOUNTER — APPOINTMENT (OUTPATIENT)
Dept: ORTHOPEDIC SURGERY | Facility: CLINIC | Age: 75
End: 2025-08-05
Payer: MEDICARE

## 2025-08-06 NOTE — PROGRESS NOTES
Verbal consent of the patient and/or verbal parental consent for patients under the age of 18 have been obtained to conduct a physical examination at this office visit.    The nurse Saritha was present in the room during the entire visit including, but not limited to the physical examination.    New patient    History Of Present Illness  08/08/25 Padmini Lang is a 75 y.o. female who presents for an evaluation of their Right Shoulder. Patient has had pain since November 2024 when she fell on it, has had multiple falls hitting it since then. Went to the ER and was told it wasn't broken, wore a sling for a week. Worsening ROM and pain with ADLs over the last 9 months. Unable to take most OTC pain medication due to multiple allergies, feels mild relief from TENS unit, has not done PT before for this injury. Rates pain as 8/10 with activity or movement.  She says her shoulder feels similar to her left shoulder that she had a reverse shoulder replacement by Dr. Angel.  She says also she is having an issue since she had her stroke with balance so since her last x-ray in November she has fell a couple more times.  I told her that we got a get her into physical therapy ASAP to work on her range of motion since she has developed a frozen shoulder.  Also I told her that even though she is interested in the regenerative injections I do not feel that she is a good candidate based off of her x-ray findings previously and that her best option down the line is going to be to get a reverse shoulder replacement for this 1 as well especially for how much her balance is off.  We also talked about different walkers that she can do using more of an upright walker then the hunched over bent walkers.  She said additionally her balance has been off since she had her stroke affecting her left side which I additionally told her this is why it would be beneficial to get the other kind of walker.  She continues to work on her balance  exercises regularly.    All previous Progress Notes and imaging results related to this patients chief complaint have been reviewed in preparation for this examination.    Past Medical History  She has a past medical history of Adhesive capsulitis of right shoulder (06/22/2017), Asthma, Mast cell disease, Personal history of diseases of the blood and blood-forming organs and certain disorders involving the immune mechanism, Personal history of other diseases of the musculoskeletal system and connective tissue (11/05/2014), Personal history of other diseases of the respiratory system (11/05/2014), Personal history of other endocrine, nutritional and metabolic disease (11/05/2014), Personal history of other specified conditions, and Stroke (Multi).    Surgical History  She has a past surgical history that includes Other surgical history (01/31/2019); Tubal ligation (05/11/2017); Total hip arthroplasty (05/11/2017); Hysterectomy (05/11/2017); Back surgery (09/23/2015); and Total knee arthroplasty (09/23/2015). left reverse total shoulder replacement 2019     Social History  She reports that she has quit smoking. Her smoking use included cigarettes. She has never used smokeless tobacco. She reports that she does not currently use alcohol. She reports that she does not use drugs.    Family History  Family History[1]     Allergies  Aspirin, Azithromycin, Doxycycline, Fish containing products, Mushroom, Naproxen, Nsaids (non-steroidal anti-inflammatory drug), Nut - unspecified, Prochlorperazine, Shellfish containing products, Sulfamethoxazole-trimethoprim, Tree nuts, Acetaminophen, Cephalexin, Clindamycin, Nicorette ds, Nicotine, Iodinated contrast media, Adhesive tape-silicones, Carbamazepine, Carbinoxamine, Ciprofloxacin, Fentanyl, Gabapentin, Lidocaine, Metronidazole, Oxycodone, and Vancomycin    Historical Clinical Intake    Review of Systems  CONSTITUTIONAL:   Negative for weight change, loss of appetite, fatigue,  weakness, fever, chills, night sweats, headaches .           HEENT:   Negative for cold, cough, sore throat, sinus pain, swollen lymph nodes.           OPHTHALMOLOGY:   Negative for diminished vision, blurred vision, loss of vision, double vision.           ALLERGY:   Negative for runny nose, scratchy throat, sinus congestion, rash, facial pressure, nasal congestion, post-nasal drip.           CARDIOLOGY:   Negative for chest pain, palpitations, murmurs, irregular heart beat, shortness of breath, leg edema, dyspnea on exertion, fatigue, dizziness.           RESPIRATORY:   Negative for chest pain, shortness of breath, swelling of the legs, asthma/copd, chest congestion, pain with breathing .           GASTROENTEROLOGY:   Negative for nausea, vomitting, heartburn, constipation, diarrhea, blood in stool, change in bowel habits, black stool.           HEMATOLOGY/LYMPH:   Negative for fatigue, loss of appetitie, easy bruising, easy bleeding, anemia, abnormal bleeding, slow healing.           ENDOCRINOLOGY:   Negative for polyuria, polydipsia, polyphagia, fatigue, weight loss, weight gain, cold intolerance, heat intolerance, diabetes.           MUSCULOSKELETAL:   Positive  for Right shoulder pain        DERMATOLOGY:   Negative for rash, bruising.           NEUROLOGY:   Negative for tingling, numbness, gait abnormality, paresthesias, weakness, sciatica.        Examination:  Right Shoulder   Edema: Negative.   Ecchymosis/Bruising: Negative.   Percussion Test: Negative.   Tuning [place] Test: Negative.   Orientation: Positive asymmetrical because how she holds her shoulder and her posture           Winging Scapula:   Positive  BILATERAL.     ROM:   Positive Causes pain and significantly decreased  Forward Flexion (0-180 degrees) Positive Causes pain and only able to get to 90  Extension (0-60 degrees) Positive causes pain  ABduction (0-180 degrees) Positive Causes pain and only able to get to 110  ADduction (30-50  degrees)  External Rotation with elbow at side (0-90 degrees)  Positive causes pain  Internal Rotation with elbow at side (0-70 degrees) Positive causes pain   Horizontal ABduction (0-90 degrees) Positive Causes pain  Horizontal ADduction (0-45 degrees) Positive Causes pain  External Rotation with elbow at 90 degrees of ABduction [0-() degrees] Positive Causes pain  Internal Rotation with elbow at 90 degrees of ABduction [0-(70-90) degrees] Positive Causes pain  Positive  Apley's Shoulder Scratch Test Inferiorly Tests a Combination of: Extension, Internal Rotation, and Scapular ADduction - Decreased due to pain and swelling  LEFT arm reaches: T6     RIGHT arm reaches: L5    Muscle Strength: Positive significantly decreased when testing all muscles  +3-+4/+5:Internal Rotation - subscapularis  +3-+4/+5: External Rotation - infraspinatus and teres minor  +3-+4/+5:ABduction - supraspinatus and deltoid  +3-+4/+5: ABduction with thumbs down and 30 degrees horizontal ADduction - supraspinatus  +3-+4/+5: Palms up with elbow bent to 15 degrees flexion and resisted upward motion - biceps  +3-+4/+5: Simultaneous resisted supination and elbow flexion- biceps  +3-+4/+5:Flexion  +3-+4/+5:Extension  +3-+4/+5:ABduction  +3-+4/+5:ADduction  +3-+4/+5:Internal Rotation at 90 Degrees  +3-+4/+5:External Rotation at 90 Degrees  +3-+4/+5:Internal Rotation at 0 Degrees  +3-+4/+5:External Rotation at 0 Degrees  +3-+4/+5:Apley's Shoulder Scratch Test Superiorly Tests a Combination of: Flexion, External Rotation, and Scapular ABduction  +3-+4/+5:Apley's Shoulder Scratch Test Inferiorly Tests a Combination of: Extension, Internal Rotation, and Scapular ADduction  +3-+4/+5:Triceps  +3-+4/+5: Biceps            Vascular:   Capillary Refillless than 2 seconds , Negative, Symmetrical:  +2/+4: Carotid pulse  +2/+4: Radial pulse  +2/+4: Ulnar pulse  +2/+4: Brachial pulse     Palpation:   Positive  Tenderness to Palpation significantly over the  "rotator Cuff interval, glenohumeral joint space, and biceps tendon.    As well as the proximal humerus                    Shoulder - AC Joint:  Painful Arc 120-180 degrees:  Positive     AC Compression Test:  Negative.   Cross Body ADduction Stress Test: Positive     Piano Key Sign:  Negative.   AC Distraction Test:  Negative.       Shoulder - Subscapularis:  Bear Hug Test:  Positive    Lift Off Test:  Positive    [place] Test:  Positive    Resisted Elbow Push Down Test:  Positive    Resisted Lift Off \"\" Test:  Positive      Shoulder - Supraspinatus:  Full Can Test:  Positive    Empty Can Test:  Positive     Resisted Elbow Push Up Test:  Positive    Drop Arm Test:  Positive      Shoulder - Infraspinatus:  Resisted ER at 0 degrees ABduction:  Positive  with arm at side.             Shoulder - Teres Minor:  Resisted ER at 90 degrees ABduction:  Negative.         Shoulder - Biceps:  Speeds Test: Positive   Yergason Test: Positive     Shoulder - Impingement  Neers: Positive  May: Positive    Shoulder - Labrum/Instability:  Anterior Release/Surprise Test: Positive     Bicep Flexion at 90 degrees ABduction Test: Positive     Posterior Apprehension Test: Positive     Posterior Release/Surprise Test: Positive    Clunk Test:  Positive     Grind Test:  Positive     [place] Test:  Positive  Thumbs Down, Positive  Thumbs Up.   Crank Test:  Positive     Horizontal Adduction Thumb Down: Positive            Imaging and Diagnostics Review:  XR Shoulder Radiographs; 11/27/2024 12:39 PM  ORDERING CLINICIAN:  JIE BLISS    FINDINGS:  No fracture.  No dislocation.  Advanced degenerative change of the right glenohumeral joint.  Osteopenia.     IMPRESSION:  No acute finding.  DJD right shoulder    Signed by Surendra Vo MD    Assessment   1. Steroid-induced osteopenia        2. Biceps strain, initial encounter  Referral to Physical Therapy    XR shoulder right 2+ views    Referral to Orthopedics and Sports Medicine "    MR shoulder right wo IV contrast    CANCELED: MR shoulder right wo IV contrast      3. Rotator cuff strain, right, initial encounter  Referral to Physical Therapy    XR shoulder right 2+ views    Referral to Orthopedics and Sports Medicine    MR shoulder right wo IV contrast    CANCELED: MR shoulder right wo IV contrast      4. Shoulder instability, right  Referral to Physical Therapy    XR shoulder right 2+ views    Referral to Orthopedics and Sports Medicine    MR shoulder right wo IV contrast    CANCELED: MR shoulder right wo IV contrast      5. Adhesive capsulitis of right shoulder  Referral to Physical Therapy    XR shoulder right 2+ views    Referral to Orthopedics and Sports Medicine    MR shoulder right wo IV contrast    CANCELED: MR shoulder right wo IV contrast      6. Chronic right shoulder pain  Referral to Physical Therapy    XR shoulder right 2+ views    Referral to Orthopedics and Sports Medicine    MR shoulder right wo IV contrast    CANCELED: MR shoulder right wo IV contrast      7. Shoulder impingement  Referral to Physical Therapy    XR shoulder right 2+ views    Referral to Orthopedics and Sports Medicine    MR shoulder right wo IV contrast    CANCELED: MR shoulder right wo IV contrast      8. Primary osteoarthritis of right shoulder  Referral to Physical Therapy    XR shoulder right 2+ views    Referral to Orthopedics and Sports Medicine    MR shoulder right wo IV contrast    CANCELED: MR shoulder right wo IV contrast      9. Injury of glenoid labrum, right, initial encounter  Referral to Physical Therapy    XR shoulder right 2+ views    Referral to Orthopedics and Sports Medicine    MR shoulder right wo IV contrast    CANCELED: MR shoulder right wo IV contrast      10. 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 History of stroke            Treatment or Intervention:  May continue alternating ice and moist heat therapy as needed     Reviewed frozen shoulder,  rotator cuff injury and/or labral injury in detail with the patient to the level of their understanding; at the time of this office visit.   Start into Physical Therapy 1-2 times a week for 8-10 weeks with manual therapy as well as dry needling and IASTM especially working on range of motion  Reviewed home exercises to be performed by the patient routinely, including wall crawls, circumduction exercises, resistance band exercises, as well as additional exercises from rotator cuff and/or labral especially working on range of motion  Recommendation over-the-counter calcium with vitamin-D 2 -3000+ milligrams a day, as well as OTC symphytum as directed daily to promote bony healing, in addition to a daily multivitamin.     Recommendation possibly in the future but will need to check her allergies over-the-counter curcumin, turmeric, boswellia, as well as egg shell membrane as directed to aid with joint inflammation.  Recommendation possibly in the future but we will have to check her allergies over-the-counter Move Free ultra for joint health.  Patient advised regarding the risks and/or potential adverse reactions and/or side effects of any prescribed medications along with any over-the-counter medications or any supplements used. Patient advised to seek immediate medical care if any adverse reactions occur. The patient and/or patient(s) parent(s) verbalized their understanding.  Discussed in detail with the patient to the level of their understanding the possibility in the future of regenerative injections versus corticosteroid injections  3T MRI of the right shoulder to further evaluate degenerative changes  You have been ordered a 3T MRI of the right shoulder. Once you contact scheduling at [phone] and obtain the date and time of your MRI, contact our office at [phone] to schedule your follow-up appointment to review your results. Please note the results of your imaging will not be discussed over the  telephone.  Follow-up after RIGHT shoulder MRI or in 2-4 weeks for a reevaluation or sooner as needed    Referral to orthopedic surgery Dr. Edmar Angel per patient preference for right reverse shoulder replacement      PRIYANK JIANG on 8/8/25 at 1:08 PM.     Please note that this report has been produced using speech recognition software.  It may contain errors related to grammar, punctuation or spelling.  Electronically signed, but not reviewed.      Priyank Jiang D.O. FAOASM         [1] No family history on file.

## 2025-08-08 ENCOUNTER — OFFICE VISIT (OUTPATIENT)
Dept: ORTHOPEDIC SURGERY | Facility: CLINIC | Age: 75
End: 2025-08-08
Payer: MEDICARE

## 2025-08-08 ENCOUNTER — HOSPITAL ENCOUNTER (OUTPATIENT)
Dept: RADIOLOGY | Facility: CLINIC | Age: 75
Discharge: HOME | End: 2025-08-08
Payer: MEDICARE

## 2025-08-08 VITALS
BODY MASS INDEX: 35.16 KG/M2 | WEIGHT: 232 LBS | HEIGHT: 68 IN | DIASTOLIC BLOOD PRESSURE: 74 MMHG | SYSTOLIC BLOOD PRESSURE: 118 MMHG

## 2025-08-08 DIAGNOSIS — S46.219A BICEPS STRAIN, INITIAL ENCOUNTER: ICD-10-CM

## 2025-08-08 DIAGNOSIS — Z98.890 HISTORY OF REVERSE TOTAL REPLACEMENT OF LEFT SHOULDER JOINT: ICD-10-CM

## 2025-08-08 DIAGNOSIS — G89.29 CHRONIC RIGHT SHOULDER PAIN: ICD-10-CM

## 2025-08-08 DIAGNOSIS — Z86.73 HISTORY OF STROKE: ICD-10-CM

## 2025-08-08 DIAGNOSIS — M25.819 SHOULDER IMPINGEMENT: ICD-10-CM

## 2025-08-08 DIAGNOSIS — M25.511 CHRONIC RIGHT SHOULDER PAIN: ICD-10-CM

## 2025-08-08 DIAGNOSIS — S49.91XA INJURY OF GLENOID LABRUM, RIGHT, INITIAL ENCOUNTER: ICD-10-CM

## 2025-08-08 DIAGNOSIS — M25.311 SHOULDER INSTABILITY, RIGHT: ICD-10-CM

## 2025-08-08 DIAGNOSIS — S46.011A ROTATOR CUFF STRAIN, RIGHT, INITIAL ENCOUNTER: ICD-10-CM

## 2025-08-08 DIAGNOSIS — T38.0X5A STEROID-INDUCED OSTEOPENIA: Primary | ICD-10-CM

## 2025-08-08 DIAGNOSIS — M75.01 ADHESIVE CAPSULITIS OF RIGHT SHOULDER: ICD-10-CM

## 2025-08-08 DIAGNOSIS — M85.80 STEROID-INDUCED OSTEOPENIA: Primary | ICD-10-CM

## 2025-08-08 DIAGNOSIS — M19.011 PRIMARY OSTEOARTHRITIS OF RIGHT SHOULDER: ICD-10-CM

## 2025-08-08 PROBLEM — S49.90XA INJURY OF GLENOID LABRUM: Status: ACTIVE | Noted: 2025-08-08

## 2025-08-08 PROCEDURE — 73030 X-RAY EXAM OF SHOULDER: CPT | Mod: RT

## 2025-08-08 PROCEDURE — 1159F MED LIST DOCD IN RCRD: CPT | Performed by: FAMILY MEDICINE

## 2025-08-08 PROCEDURE — 99204 OFFICE O/P NEW MOD 45 MIN: CPT | Performed by: FAMILY MEDICINE

## 2025-08-08 PROCEDURE — 99202 OFFICE O/P NEW SF 15 MIN: CPT | Performed by: FAMILY MEDICINE

## 2025-08-08 PROCEDURE — 1125F AMNT PAIN NOTED PAIN PRSNT: CPT | Performed by: FAMILY MEDICINE

## 2025-08-08 RX ORDER — FLUTICASONE FUROATE AND VILANTEROL TRIFENATATE 200; 25 UG/1; UG/1
1 POWDER RESPIRATORY (INHALATION) DAILY
COMMUNITY

## 2025-08-08 RX ORDER — ACETAMINOPHEN 500 MG
2 TABLET ORAL
COMMUNITY
Start: 2025-03-14

## 2025-08-08 RX ORDER — LEVOTHYROXINE SODIUM 150 UG/1
1 TABLET ORAL
COMMUNITY
Start: 2025-05-10

## 2025-08-08 ASSESSMENT — PATIENT HEALTH QUESTIONNAIRE - PHQ9
SUM OF ALL RESPONSES TO PHQ9 QUESTIONS 1 AND 2: 0
1. LITTLE INTEREST OR PLEASURE IN DOING THINGS: NOT AT ALL
2. FEELING DOWN, DEPRESSED OR HOPELESS: NOT AT ALL

## 2025-08-08 ASSESSMENT — ENCOUNTER SYMPTOMS
DEPRESSION: 0
OCCASIONAL FEELINGS OF UNSTEADINESS: 1
LOSS OF SENSATION IN FEET: 0

## 2025-08-08 ASSESSMENT — PAIN SCALES - GENERAL: PAINLEVEL_OUTOF10: 8

## 2025-08-08 NOTE — PATIENT INSTRUCTIONS
May continue PRICE therapy as needed     Reviewed handout rotator cuff injury and/or labral injury in detail with the patient to the level of their understanding; a copy of this handout was provided to the patient at the time of this office visit.   Start into Physical Therapy 1-2 times a week for 8-10 weeks with manual therapy as well as dry needling and IASTM  Reviewed home exercises to be performed by the patient routinely, including wall crawls, circumduction exercises, resistance band exercises, as well as additional exercises from rotator cuff and/or labral  Recommendation over-the-counter calcium with vitamin-D 2 -3000+ milligrams a day, as well as OTC symphytum as directed daily to promote bony healing, in addition to a daily multivitamin.     Recommendation over-the-counter curcumin, turmeric, boswellia, as well as egg shell membrane as directed to aid with joint inflammation.  Recommendation over-the-counter Move Free for joint health.  Patient advised regarding the risks and/or potential adverse reactions and/or side effects of any prescribed medications along with any over-the-counter medications or any supplements used. Patient advised to seek immediate medical care if any adverse reactions occur. The patient and/or patient(s) parent(s) verbalized their understanding.  Discussed in detail with the patient to the level of their understanding the possibility in the future of regenerative injections versus corticosteroid injections  You have been ordered a 3T MRI of the right shoulder. Once you contact scheduling at [phone] and obtain the date and time of your MRI, contact our office at [phone] to schedule your follow-up appointment to review your results. Please note the results of your imaging will not be discussed over the telephone.  Follow-up after RIGHT shoulder MRI or in 2-4 weeks for a reevaluation or sooner as needed  Referral to orthopedic surgery Dr. Edmar Angel per patient  preference [address] [facility], [place] [address] Phone: [phone]

## 2025-08-22 ENCOUNTER — APPOINTMENT (OUTPATIENT)
Dept: RADIOLOGY | Facility: HOSPITAL | Age: 75
End: 2025-08-22
Payer: MEDICARE

## 2025-08-22 DIAGNOSIS — M19.011 PRIMARY OSTEOARTHRITIS OF RIGHT SHOULDER: ICD-10-CM

## 2025-08-22 DIAGNOSIS — S46.011A ROTATOR CUFF STRAIN, RIGHT, INITIAL ENCOUNTER: ICD-10-CM

## 2025-08-22 DIAGNOSIS — M75.01 ADHESIVE CAPSULITIS OF RIGHT SHOULDER: ICD-10-CM

## 2025-08-22 DIAGNOSIS — S46.219A BICEPS STRAIN, INITIAL ENCOUNTER: ICD-10-CM

## 2025-08-22 DIAGNOSIS — M25.311 SHOULDER INSTABILITY, RIGHT: ICD-10-CM

## 2025-08-22 DIAGNOSIS — M25.819 SHOULDER IMPINGEMENT: ICD-10-CM

## 2025-08-22 DIAGNOSIS — M25.511 CHRONIC RIGHT SHOULDER PAIN: ICD-10-CM

## 2025-08-22 DIAGNOSIS — S49.91XA INJURY OF GLENOID LABRUM, RIGHT, INITIAL ENCOUNTER: ICD-10-CM

## 2025-08-22 DIAGNOSIS — G89.29 CHRONIC RIGHT SHOULDER PAIN: ICD-10-CM

## 2025-08-22 PROCEDURE — 73221 MRI JOINT UPR EXTREM W/O DYE: CPT | Mod: RT

## 2025-08-25 ENCOUNTER — OFFICE VISIT (OUTPATIENT)
Dept: ORTHOPEDIC SURGERY | Facility: CLINIC | Age: 75
End: 2025-08-25
Payer: MEDICARE

## 2025-08-25 VITALS
HEART RATE: 66 BPM | DIASTOLIC BLOOD PRESSURE: 74 MMHG | WEIGHT: 232 LBS | SYSTOLIC BLOOD PRESSURE: 127 MMHG | BODY MASS INDEX: 35.16 KG/M2 | HEIGHT: 68 IN

## 2025-08-25 DIAGNOSIS — M75.101 SECONDARY OSTEOARTHRITIS OF RIGHT SHOULDER DUE TO ROTATOR CUFF TEAR: Primary | ICD-10-CM

## 2025-08-25 DIAGNOSIS — S43.431A DEGENERATIVE SUPERIOR LABRAL ANTERIOR-TO-POSTERIOR (SLAP) TEAR OF RIGHT SHOULDER: ICD-10-CM

## 2025-08-25 DIAGNOSIS — M24.111 LABRAL TEAR OF SHOULDER, DEGENERATIVE, RIGHT: ICD-10-CM

## 2025-08-25 DIAGNOSIS — M19.011 PRIMARY OSTEOARTHRITIS OF RIGHT SHOULDER: ICD-10-CM

## 2025-08-25 DIAGNOSIS — M25.311 SHOULDER INSTABILITY, RIGHT: ICD-10-CM

## 2025-08-25 DIAGNOSIS — M25.819 SHOULDER IMPINGEMENT: ICD-10-CM

## 2025-08-25 DIAGNOSIS — G89.29 CHRONIC RIGHT SHOULDER PAIN: ICD-10-CM

## 2025-08-25 DIAGNOSIS — M67.813 BICEPS TENDINOSIS OF RIGHT SHOULDER: ICD-10-CM

## 2025-08-25 DIAGNOSIS — M25.511 CHRONIC RIGHT SHOULDER PAIN: ICD-10-CM

## 2025-08-25 DIAGNOSIS — M19.211 SECONDARY OSTEOARTHRITIS OF RIGHT SHOULDER DUE TO ROTATOR CUFF TEAR: Primary | ICD-10-CM

## 2025-08-25 DIAGNOSIS — M75.01 ADHESIVE CAPSULITIS OF RIGHT SHOULDER: ICD-10-CM

## 2025-08-25 PROBLEM — M67.88 BICEPS TENDINOSIS OF RIGHT UPPER EXTREMITY: Status: ACTIVE | Noted: 2025-08-25

## 2025-08-25 PROCEDURE — 1125F AMNT PAIN NOTED PAIN PRSNT: CPT | Performed by: FAMILY MEDICINE

## 2025-08-25 PROCEDURE — 99212 OFFICE O/P EST SF 10 MIN: CPT | Performed by: FAMILY MEDICINE

## 2025-08-25 PROCEDURE — 1159F MED LIST DOCD IN RCRD: CPT | Performed by: FAMILY MEDICINE

## 2025-08-25 PROCEDURE — 99214 OFFICE O/P EST MOD 30 MIN: CPT | Performed by: FAMILY MEDICINE

## 2025-08-25 ASSESSMENT — ENCOUNTER SYMPTOMS
DEPRESSION: 0
LOSS OF SENSATION IN FEET: 1
OCCASIONAL FEELINGS OF UNSTEADINESS: 1

## 2025-08-25 ASSESSMENT — PATIENT HEALTH QUESTIONNAIRE - PHQ9
2. FEELING DOWN, DEPRESSED OR HOPELESS: NOT AT ALL
SUM OF ALL RESPONSES TO PHQ9 QUESTIONS 1 AND 2: 0
1. LITTLE INTEREST OR PLEASURE IN DOING THINGS: NOT AT ALL

## 2025-08-25 ASSESSMENT — COLUMBIA-SUICIDE SEVERITY RATING SCALE - C-SSRS
2. HAVE YOU ACTUALLY HAD ANY THOUGHTS OF KILLING YOURSELF?: NO
6. HAVE YOU EVER DONE ANYTHING, STARTED TO DO ANYTHING, OR PREPARED TO DO ANYTHING TO END YOUR LIFE?: NO
1. IN THE PAST MONTH, HAVE YOU WISHED YOU WERE DEAD OR WISHED YOU COULD GO TO SLEEP AND NOT WAKE UP?: NO

## 2025-08-25 ASSESSMENT — PAIN SCALES - GENERAL: PAINLEVEL_OUTOF10: 8
